# Patient Record
Sex: FEMALE | Race: WHITE | NOT HISPANIC OR LATINO | Employment: UNEMPLOYED | URBAN - METROPOLITAN AREA
[De-identification: names, ages, dates, MRNs, and addresses within clinical notes are randomized per-mention and may not be internally consistent; named-entity substitution may affect disease eponyms.]

---

## 2017-09-03 ENCOUNTER — HOSPITAL ENCOUNTER (EMERGENCY)
Facility: OTHER | Age: 22
Discharge: HOME | End: 2017-09-04
Attending: EMERGENCY MEDICINE
Payer: MEDICAID

## 2017-09-03 ENCOUNTER — NURSE TRIAGE (OUTPATIENT)
Dept: ADMINISTRATIVE | Facility: CLINIC | Age: 22
End: 2017-09-03

## 2017-09-03 DIAGNOSIS — J06.9 ACUTE URI: Primary | ICD-10-CM

## 2017-09-03 LAB
B-HCG UR QL: NEGATIVE
CTP QC/QA: YES

## 2017-09-03 PROCEDURE — 99284 EMERGENCY DEPT VISIT MOD MDM: CPT | Mod: 25

## 2017-09-03 PROCEDURE — 96372 THER/PROPH/DIAG INJ SC/IM: CPT

## 2017-09-03 PROCEDURE — 81025 URINE PREGNANCY TEST: CPT | Performed by: EMERGENCY MEDICINE

## 2017-09-03 NOTE — TELEPHONE ENCOUNTER
"  Reason for Disposition   [1] Caller can't get FB out AND [2] it's causing pain    Answer Assessment - Initial Assessment Questions  1. MECHANISM: "How did it happen?"       It is a feeling  2. LOCATION: "Where is the FB (e.g., splinter) located?"       I feel it in my throat  3. OBJECT: "What type of FB was it?"       Not really sure  4. DEPTH: "How deep do you think the FB goes?"       unsure  5. ONSET: "When did the injury occur?" (Minutes or hours)       6 days  6. PAIN: "Is it painful?" If so, ask: "How bad is the pain?"  (Scale 1-10; or mild, moderate, severe)      Now it is  7. TETANUS: "When was the last tetanus booster?"      unsure      8. PREGNANCY: "Is there any chance you are pregnant?" "When was your last menstrual period?"      No,8/27    Protocols used: ST SKIN FOREIGN BODY-A-AH    "

## 2017-09-04 VITALS
DIASTOLIC BLOOD PRESSURE: 73 MMHG | HEART RATE: 63 BPM | RESPIRATION RATE: 18 BRPM | BODY MASS INDEX: 36.8 KG/M2 | SYSTOLIC BLOOD PRESSURE: 125 MMHG | OXYGEN SATURATION: 99 % | HEIGHT: 62 IN | WEIGHT: 200 LBS | TEMPERATURE: 98 F

## 2017-09-04 PROCEDURE — 63600175 PHARM REV CODE 636 W HCPCS: Performed by: EMERGENCY MEDICINE

## 2017-09-04 RX ORDER — FLUTICASONE PROPIONATE 50 MCG
2 SPRAY, SUSPENSION (ML) NASAL DAILY
Qty: 16 G | Refills: 0 | Status: SHIPPED | OUTPATIENT
Start: 2017-09-04 | End: 2019-06-12

## 2017-09-04 RX ORDER — BENZONATATE 100 MG/1
100 CAPSULE ORAL 3 TIMES DAILY PRN
Qty: 20 CAPSULE | Refills: 0 | Status: SHIPPED | OUTPATIENT
Start: 2017-09-04 | End: 2017-09-14

## 2017-09-04 RX ORDER — LORATADINE 10 MG/1
10 TABLET ORAL DAILY
Qty: 30 TABLET | Refills: 0 | Status: SHIPPED | OUTPATIENT
Start: 2017-09-04 | End: 2018-11-21

## 2017-09-04 RX ORDER — AMOXICILLIN 500 MG/1
500 CAPSULE ORAL 3 TIMES DAILY
Qty: 30 CAPSULE | Refills: 0 | Status: SHIPPED | OUTPATIENT
Start: 2017-09-04 | End: 2017-09-14

## 2017-09-04 RX ORDER — HYDROCODONE POLISTIREX AND CHLORPHENIRAMINE POLISTIREX 10; 8 MG/5ML; MG/5ML
5 SUSPENSION, EXTENDED RELEASE ORAL NIGHTLY PRN
Qty: 60 ML | Refills: 0 | Status: SHIPPED | OUTPATIENT
Start: 2017-09-04 | End: 2019-06-12

## 2017-09-04 RX ORDER — DEXAMETHASONE SODIUM PHOSPHATE 4 MG/ML
8 INJECTION, SOLUTION INTRA-ARTICULAR; INTRALESIONAL; INTRAMUSCULAR; INTRAVENOUS; SOFT TISSUE
Status: COMPLETED | OUTPATIENT
Start: 2017-09-04 | End: 2017-09-04

## 2017-09-04 RX ADMIN — DEXAMETHASONE SODIUM PHOSPHATE 8 MG: 4 INJECTION, SOLUTION INTRA-ARTICULAR; INTRALESIONAL; INTRAMUSCULAR; INTRAVENOUS; SOFT TISSUE at 12:09

## 2017-09-04 NOTE — ED PROVIDER NOTES
Encounter Date: 9/3/2017       History     Chief Complaint   Patient presents with    URI     Pt presents to er with c/o URI symptoms for 7 days.      22 y.o. Female with past medical history of anxiety, allergic rhinitis, rheumatoid arthritis, tobacco abuse and others presents to emergency department complaining of acute nasal congestion, runny nose, sore throat, and dry cough that began about a week ago.  She reports taking Theraflu a few times for the symptoms which did not help.  She describes throat pain as scratchy and constant.  She denies associated shortness of breath, difficulty swallowing, vomiting or food intolerance.          Review of patient's allergies indicates:   Allergen Reactions    No known drug allergies      Past Medical History:   Diagnosis Date    Anemia     Anxiety     AR (allergic rhinitis)     Irregular menses     Obesity     Tobacco abuse      Past Surgical History:   Procedure Laterality Date    APPENDECTOMY       Family History   Problem Relation Age of Onset    Hyperlipidemia Mother     Seizures Mother      Mom had seizures as a child.     Diabetes Father     Heart disease Father     Hypertension Father     Hyperlipidemia Father     Alcohol abuse Maternal Uncle     Drug abuse Maternal Uncle     Arthritis Paternal Aunt     Stroke Paternal Aunt     Early death Paternal Uncle 46     heart attack    Alcohol abuse Maternal Grandmother     Diabetes Maternal Grandmother     Early death Maternal Grandmother 41     heart attack    Seizures Maternal Grandmother     Diabetes Maternal Grandfather     Diabetes Paternal Grandmother      Social History   Substance Use Topics    Smoking status: Former Smoker     Packs/day: 0.25     Years: 0.50     Types: Cigarettes     Quit date: 5/20/2014    Smokeless tobacco: Not on file    Alcohol use No     Review of Systems   Constitutional: Negative for appetite change and fever.   HENT: Positive for congestion, postnasal drip,  rhinorrhea and sore throat. Negative for drooling, sinus pressure, trouble swallowing and voice change.    Eyes: Positive for visual disturbance (history of elevated eye pressures right eye and decreased peripheral vision. Followed for this at [facility]. Work up ongoing.). Negative for photophobia.   Respiratory: Positive for cough. Negative for shortness of breath.    Cardiovascular: Negative for chest pain and palpitations.   Gastrointestinal: Negative for diarrhea, nausea and vomiting.   Genitourinary: Negative for dysuria and hematuria.   Musculoskeletal: Negative for neck stiffness.   Skin: Negative for rash.   Neurological: Positive for headaches.       Physical Exam     Initial Vitals [09/03/17 2059]   BP Pulse Resp Temp SpO2   127/82 83 18 98.3 °F (36.8 °C) 99 %      MAP       97         Physical Exam    Nursing note and vitals reviewed.  Constitutional: Vital signs are normal. She appears well-developed and well-nourished. She is not diaphoretic. No distress.   HENT:   Head: Normocephalic and atraumatic.   Mouth/Throat: Uvula is midline and mucous membranes are normal. No trismus in the jaw. No uvula swelling. Posterior oropharyngeal erythema present. No oropharyngeal exudate or posterior oropharyngeal edema.   Eyes: Conjunctivae are normal. Pupils are equal, round, and reactive to light.   Neck: Normal range of motion and phonation normal. Neck supple. No stridor present. No spinous process tenderness present.   Cardiovascular: Normal rate and intact distal pulses.   Pulmonary/Chest: No accessory muscle usage. No tachypnea. No respiratory distress.   Abdominal: She exhibits no distension. There is no tenderness.   Musculoskeletal: Normal range of motion. She exhibits no tenderness.   Neurological: She is alert and oriented to person, place, and time.   Skin: Skin is warm. Capillary refill takes less than 2 seconds.   Psychiatric: She has a normal mood and affect.         ED Course   Procedures  Labs Reviewed    POCT URINE PREGNANCY                               ED Course      Labs Reviewed  Admission on 09/03/2017, Discharged on 09/04/2017   Component Date Value Ref Range Status    POC Preg Test, Ur 09/03/2017 Negative  Negative Final     Acceptable 09/03/2017 Yes   Final        Medications given in ED    Medications   dexamethasone injection 8 mg (8 mg Intramuscular Given 9/4/17 0059)     Discharge Medications     Discharge Medication List as of 9/4/2017 12:53 AM      START taking these medications    Details   amoxicillin (AMOXIL) 500 MG capsule Take 1 capsule (500 mg total) by mouth 3 (three) times daily., Starting Mon 9/4/2017, Until Thu 9/14/2017, Normal      benzonatate (TESSALON) 100 MG capsule Take 1 capsule (100 mg total) by mouth 3 (three) times daily as needed for Cough., Starting Mon 9/4/2017, Until Thu 9/14/2017, Normal      fluticasone (FLONASE) 50 mcg/actuation nasal spray 2 sprays by Each Nare route once daily., Starting Mon 9/4/2017, Normal      hydrocodone-chlorpheniramine (TUSSIONEX) 10-8 mg/5 mL suspension Take 5 mLs by mouth nightly as needed for Cough or Congestion., Starting Mon 9/4/2017, Print      loratadine (CLARITIN) 10 mg tablet Take 1 tablet (10 mg total) by mouth once daily., Starting Mon 9/4/2017, Until Tue 9/4/2018, Normal         CONTINUE these medications which have NOT CHANGED    Details   diclofenac (VOLTAREN) 75 MG EC tablet TK 1 T PO BID FOR 10 DAYS PRF PAIN, Historical Med      ergocalciferol (ERGOCALCIFEROL) 50,000 unit Cap Take 1 capsule (50,000 Units total) by mouth every 7 days., Starting 3/23/2017, Until Discontinued, Normal      etonogestrel-ethinyl estradiol (NUVARING) 0.12-0.015 mg/24 hr vaginal ring Place 1 each vaginally every 21 days. Insert one (1) ring vaginally and leave in place for three (3) weeks, then remove for one (1) week., Starting 3/20/2017, Until Tue 3/20/18, Normal      ferrous gluconate (FERGON) 240 (27 FE) MG tablet Take 2 tablets (480 mg  total) by mouth once daily., Starting 11/21/2014, Until Discontinued, Normal      hydrocodone-acetaminophen 5-325mg (NORCO) 5-325 mg per tablet TK 1 T PO Q 4 TO 6 H PRN P, Historical Med      !! ibuprofen (ADVIL,MOTRIN) 200 MG tablet Take 200 mg by mouth., Until Discontinued, Historical Med      !! ibuprofen (ADVIL,MOTRIN) 800 MG tablet TK 1 T PO Q 6-8 H PRN P, Historical Med      norgestimate-ethinyl estradiol (ORTHO-CYCLEN) 0.25-35 mg-mcg per tablet Take 1 tablet by mouth once daily., Starting 7/27/2015, Until Tue 7/26/16, Normal      PNV w/o calcium-iron fum-FA (M-VIT) 27-1 mg Tab Take 1 tablet by mouth once daily., Starting 3/23/2017, Until Fri 3/23/18, Normal      PNV38-iron cbn&gluc-FA-DSS-dha (CITRANATAL ASSURE) 35-1- mg Cmpk Take one daily, Normal       !! - Potential duplicate medications found. Please discuss with provider.                Patient discharged to home in stable condition with instructions to:   1. Please take all meds as prescribed.  2. Follow-up with your primary care doctor   3. Return precautions discussed and patient and/or family/caretaker understands to return to the emergency room for any concerns including worsening of your current symptoms, fever, chills, night sweats, worsening pain, chest pain, shortness of breath, nausea, vomiting, diarrhea, bleeding, headache, difficulty talking, visual disturbances, weakness, numbness or any other acute concerns      Note was created using voice recognition software. Note may have occasional typographical errors that may not have been identified and edited despite good susie initial review prior to signing.    Clinical Impression:   The encounter diagnosis was Acute URI.                           Alyssa Cam MD  09/30/17 [id number]

## 2017-09-04 NOTE — ED NOTES
Pt identifiers checked and correct.    LOC: The patient is awake, alert and aware of environment with an appropriate affect, the patient is oriented x 3 and speaking appropriately.   APPEARANCE: Patient resting comfortably and in no acute distress, patient is clean and well groomed, patient's clothing is properly fastened.   SKIN: The skin is warm and dry, color consistent with ethnicity, patient has normal skin turgor and moist mucus membranes, skin intact, no breakdown or bruising noted. Denies any fever at this time.   MUSCULOSKELETAL: Patient moving all extremities spontaneously, no obvious swelling or deformities noted.   RESPIRATORY: Airway is open and patent, respirations are spontaneous, patient has a normal effort and rate, no accessory muscle use noted. + cough, congestion.  CARDIAC: Patient has a normal rate and regular rhythm, no periphreal edema noted, capillary refill < 3 seconds.   ABDOMEN: Soft and non tender to palpation, no distention noted, active bowel sounds present in all four quadrants.   NEUROLOGIC: PERRL, 3 mm bilaterally, eyes open spontaneously, behavior appropriate to situation, follows commands, facial expression symmetrical, bilateral hand grasp equal and even, purposeful motor response noted, normal sensation in all extremities when touched with a finger.

## 2018-05-01 ENCOUNTER — HOSPITAL ENCOUNTER (EMERGENCY)
Facility: HOSPITAL | Age: 23
Discharge: HOME | End: 2018-05-01
Attending: INTERNAL MEDICINE
Payer: MEDICAID

## 2018-05-01 VITALS
DIASTOLIC BLOOD PRESSURE: 75 MMHG | HEIGHT: 62 IN | HEART RATE: 94 BPM | RESPIRATION RATE: 18 BRPM | TEMPERATURE: 99 F | BODY MASS INDEX: 34.96 KG/M2 | WEIGHT: 190 LBS | SYSTOLIC BLOOD PRESSURE: 128 MMHG | OXYGEN SATURATION: 98 %

## 2018-05-01 DIAGNOSIS — A08.4 VIRAL GASTROENTERITIS: Primary | ICD-10-CM

## 2018-05-01 PROBLEM — K29.70 VIRAL GASTRITIS: Status: ACTIVE | Noted: 2018-05-01

## 2018-05-01 LAB
B-HCG UR QL: NEGATIVE
BILIRUBIN, POC UA: ABNORMAL
BLOOD, POC UA: ABNORMAL
CLARITY, POC UA: CLEAR
COLOR, POC UA: YELLOW
CTP QC/QA: YES
GLUCOSE, POC UA: NEGATIVE
KETONES, POC UA: NEGATIVE
LEUKOCYTE EST, POC UA: ABNORMAL
NITRITE, POC UA: NEGATIVE
PH UR STRIP: 6 [PH]
PROTEIN, POC UA: ABNORMAL
SPECIFIC GRAVITY, POC UA: >=1.03
UROBILINOGEN, POC UA: 1 E.U./DL

## 2018-05-01 PROCEDURE — 81003 URINALYSIS AUTO W/O SCOPE: CPT

## 2018-05-01 PROCEDURE — 99283 EMERGENCY DEPT VISIT LOW MDM: CPT

## 2018-05-01 PROCEDURE — 81025 URINE PREGNANCY TEST: CPT | Performed by: INTERNAL MEDICINE

## 2018-05-01 RX ORDER — ONDANSETRON 4 MG/1
4 TABLET, FILM COATED ORAL EVERY 6 HOURS PRN
Qty: 12 TABLET | Refills: 0 | Status: SHIPPED | OUTPATIENT
Start: 2018-05-01 | End: 2019-06-12

## 2018-05-01 NOTE — ED PROVIDER NOTES
Encounter Date: 5/1/2018       History     Chief Complaint   Patient presents with    Abdominal Pain     pt reports epigatric pain that started yesterday with diarrhea, also reports vomitting once today    Vomiting    Diarrhea     23-year-old female presents to the emergency department complaining of abdominal pain ×3 days, loose stools ×3 days and one episode of emesis today with nausea.  Denies fevers/chills      The history is provided by the patient. No  was used.   Abdominal Pain   The current episode started several days ago. The onset of the illness was gradual. The problem has not changed since onset.The abdominal pain is located in the epigastric region. The abdominal pain does not radiate. The severity of the abdominal pain is 3/10. The abdominal pain is relieved by nothing. The other symptoms of the illness include nausea, vomiting and diarrhea. The other symptoms of the illness do not include fever or melena.   The diarrhea began 3 to 5 days ago. The diarrhea is semi-solid. The diarrhea occurs 2 - 4 times per day.   Nausea began today. The nausea is associated with eating.   The vomiting began today. Vomiting occurred once. The emesis contains stomach contents.   The patient states that she believes she is currently not pregnant. The patient has had a change in bowel habit. Symptoms associated with the illness do not include chills.     Review of patient's allergies indicates:   Allergen Reactions    No known drug allergies      Past Medical History:   Diagnosis Date    Anemia     Anxiety     AR (allergic rhinitis)     Irregular menses     Obesity     Tobacco abuse      Past Surgical History:   Procedure Laterality Date    APPENDECTOMY       Family History   Problem Relation Age of Onset    Hyperlipidemia Mother     Seizures Mother      Mom had seizures as a child.     Diabetes Father     Heart disease Father     Hypertension Father     Hyperlipidemia Father      Alcohol abuse Maternal Uncle     Drug abuse Maternal Uncle     Arthritis Paternal Aunt     Stroke Paternal Aunt     Early death Paternal Uncle 46     heart attack    Alcohol abuse Maternal Grandmother     Diabetes Maternal Grandmother     Early death Maternal Grandmother 41     heart attack    Seizures Maternal Grandmother     Diabetes Maternal Grandfather     Diabetes Paternal Grandmother      Social History   Substance Use Topics    Smoking status: Former Smoker     Packs/day: 0.25     Years: 0.50     Types: Cigarettes     Quit date: 5/20/2014    Smokeless tobacco: Not on file    Alcohol use No     Review of Systems   Constitutional: Negative for chills and fever.   Gastrointestinal: Positive for abdominal pain, diarrhea, nausea and vomiting. Negative for melena.   All other systems reviewed and are negative.      Physical Exam     Initial Vitals [05/01/18 1243]   BP Pulse Resp Temp SpO2   128/75 94 18 98.7 °F (37.1 °C) 98 %      MAP       92.67         Physical Exam    Nursing note and vitals reviewed.  Constitutional: She appears well-developed and well-nourished. No distress.   HENT:   Head: Normocephalic and atraumatic.   Right Ear: External ear normal.   Left Ear: External ear normal.   Eyes: EOM are normal.   Neck: Normal range of motion.   Cardiovascular: Normal rate and regular rhythm.   Pulmonary/Chest: Breath sounds normal. No respiratory distress.   Abdominal: Soft. Bowel sounds are normal. She exhibits no distension.   Musculoskeletal: Normal range of motion.   Neurological: She is alert.   Skin: Skin is warm and dry.   Psychiatric: She has a normal mood and affect.         ED Course   Procedures  Labs Reviewed   POCT URINALYSIS W/O SCOPE - Abnormal; Notable for the following:        Result Value    Glucose, UA Negative (*)     Bilirubin, UA 1+ (*)     Ketones, UA Negative (*)     Spec Grav UA >=1.030 (*)     Blood, UA 3+ (*)     Protein, UA 1+ (*)     Nitrite, UA Negative (*)      Leukocytes, UA Trace (*)     All other components within normal limits   POCT URINE PREGNANCY   POCT URINALYSIS W/O SCOPE             Medical Decision Making:   Initial Assessment:   23-year-old female presents to the emergency department complaining of abdominal pain ×3 days, loose stools ×3 days and one episode of emesis today with nausea.  Denies fevers/chills  ED Management:  Patient was given instructions for acute gastroenteritis and a prescription for Zofran.  She was advised to follow-up with her primary care physician within the next 3 days for reevaluation.                      Clinical Impression:   The encounter diagnosis was Viral gastroenteritis.    Disposition:   Disposition: Discharged  Condition: Stable                        Woody Sims MD  05/01/18 7936

## 2018-11-21 PROBLEM — A08.4 VIRAL GASTROENTERITIS: Status: RESOLVED | Noted: 2018-05-01 | Resolved: 2018-11-21

## 2018-11-21 PROBLEM — M06.9 RHEUMATOID ARTHRITIS INVOLVING BOTH FEET: Status: ACTIVE | Noted: 2018-11-21

## 2019-03-27 PROBLEM — O35.EXX0 PYELECTASIS OF FETUS ON PRENATAL ULTRASOUND: Status: ACTIVE | Noted: 2019-03-27

## 2019-03-27 PROBLEM — D64.9 ANEMIA: Status: ACTIVE | Noted: 2019-03-27

## 2019-07-18 PROBLEM — O35.EXX0 PYELECTASIS OF FETUS ON PRENATAL ULTRASOUND: Status: RESOLVED | Noted: 2019-03-27 | Resolved: 2019-07-18

## 2019-07-18 PROBLEM — M06.9 RHEUMATOID ARTHRITIS INVOLVING BOTH FEET: Status: RESOLVED | Noted: 2018-11-21 | Resolved: 2019-07-18

## 2021-04-15 ENCOUNTER — PATIENT MESSAGE (OUTPATIENT)
Dept: RESEARCH | Facility: HOSPITAL | Age: 26
End: 2021-04-15

## 2021-12-06 ENCOUNTER — HOSPITAL ENCOUNTER (EMERGENCY)
Facility: HOSPITAL | Age: 26
Discharge: HOME | End: 2021-12-06
Attending: EMERGENCY MEDICINE
Payer: MEDICAID

## 2021-12-06 VITALS
HEART RATE: 98 BPM | SYSTOLIC BLOOD PRESSURE: 128 MMHG | RESPIRATION RATE: 20 BRPM | DIASTOLIC BLOOD PRESSURE: 68 MMHG | HEIGHT: 62 IN | TEMPERATURE: 99 F | OXYGEN SATURATION: 100 % | BODY MASS INDEX: 36.62 KG/M2 | WEIGHT: 199 LBS

## 2021-12-06 DIAGNOSIS — H66.93 BILATERAL OTITIS MEDIA, UNSPECIFIED OTITIS MEDIA TYPE: Primary | ICD-10-CM

## 2021-12-06 PROCEDURE — 99283 EMERGENCY DEPT VISIT LOW MDM: CPT | Mod: ER

## 2021-12-06 RX ORDER — AMOXICILLIN AND CLAVULANATE POTASSIUM 875; 125 MG/1; MG/1
1 TABLET, FILM COATED ORAL 2 TIMES DAILY
Qty: 14 TABLET | Refills: 0 | Status: SHIPPED | OUTPATIENT
Start: 2021-12-06

## 2021-12-21 ENCOUNTER — OFFICE VISIT (OUTPATIENT)
Dept: URGENT CARE | Facility: CLINIC | Age: 26
End: 2021-12-21
Payer: MEDICAID

## 2021-12-21 VITALS
WEIGHT: 192 LBS | HEIGHT: 62 IN | RESPIRATION RATE: 20 BRPM | DIASTOLIC BLOOD PRESSURE: 81 MMHG | SYSTOLIC BLOOD PRESSURE: 140 MMHG | BODY MASS INDEX: 35.33 KG/M2 | TEMPERATURE: 99 F | OXYGEN SATURATION: 95 % | HEART RATE: 105 BPM

## 2021-12-21 DIAGNOSIS — U07.1 COVID-19: ICD-10-CM

## 2021-12-21 DIAGNOSIS — Z20.822 ENCOUNTER FOR LABORATORY TESTING FOR COVID-19 VIRUS: Primary | ICD-10-CM

## 2021-12-21 LAB
CTP QC/QA: YES
SARS-COV-2 RDRP RESP QL NAA+PROBE: POSITIVE

## 2021-12-21 PROCEDURE — 99203 OFFICE O/P NEW LOW 30 MIN: CPT | Mod: S$GLB,,, | Performed by: PHYSICIAN ASSISTANT

## 2021-12-21 PROCEDURE — U0002: ICD-10-PCS | Mod: QW,S$GLB,, | Performed by: PHYSICIAN ASSISTANT

## 2021-12-21 PROCEDURE — 99203 PR OFFICE/OUTPT VISIT, NEW, LEVL III, 30-44 MIN: ICD-10-PCS | Mod: S$GLB,,, | Performed by: PHYSICIAN ASSISTANT

## 2021-12-21 PROCEDURE — U0002 COVID-19 LAB TEST NON-CDC: HCPCS | Mod: QW,S$GLB,, | Performed by: PHYSICIAN ASSISTANT

## 2021-12-22 ENCOUNTER — INFUSION (OUTPATIENT)
Dept: INFECTIOUS DISEASES | Facility: HOSPITAL | Age: 26
End: 2021-12-22
Attending: INTERNAL MEDICINE
Payer: MEDICAID

## 2021-12-22 VITALS
HEART RATE: 88 BPM | HEIGHT: 62 IN | RESPIRATION RATE: 18 BRPM | SYSTOLIC BLOOD PRESSURE: 108 MMHG | OXYGEN SATURATION: 100 % | TEMPERATURE: 98 F | BODY MASS INDEX: 37.17 KG/M2 | WEIGHT: 202 LBS | DIASTOLIC BLOOD PRESSURE: 58 MMHG

## 2021-12-22 DIAGNOSIS — U07.1 COVID-19: Primary | ICD-10-CM

## 2021-12-22 PROCEDURE — 63600175 PHARM REV CODE 636 W HCPCS: Performed by: INTERNAL MEDICINE

## 2021-12-22 PROCEDURE — 25000003 PHARM REV CODE 250: Performed by: INTERNAL MEDICINE

## 2021-12-22 PROCEDURE — M0243 CASIRIVI AND IMDEVI INFUSION: HCPCS | Performed by: INTERNAL MEDICINE

## 2021-12-22 RX ORDER — SODIUM CHLORIDE 0.9 % (FLUSH) 0.9 %
10 SYRINGE (ML) INJECTION
Status: ACTIVE | OUTPATIENT
Start: 2021-12-22

## 2021-12-22 RX ORDER — ACETAMINOPHEN 325 MG/1
650 TABLET ORAL ONCE AS NEEDED
Status: DISCONTINUED | OUTPATIENT
Start: 2021-12-22 | End: 2023-04-17

## 2021-12-22 RX ORDER — ALBUTEROL SULFATE 90 UG/1
2 AEROSOL, METERED RESPIRATORY (INHALATION)
Status: ACTIVE | OUTPATIENT
Start: 2021-12-22

## 2021-12-22 RX ORDER — DIPHENHYDRAMINE HYDROCHLORIDE 50 MG/ML
25 INJECTION INTRAMUSCULAR; INTRAVENOUS ONCE AS NEEDED
Status: ACTIVE | OUTPATIENT
Start: 2021-12-22 | End: 2033-05-20

## 2021-12-22 RX ORDER — EPINEPHRINE 0.3 MG/.3ML
0.3 INJECTION SUBCUTANEOUS
Status: ACTIVE | OUTPATIENT
Start: 2021-12-22

## 2021-12-22 RX ORDER — ONDANSETRON 4 MG/1
4 TABLET, ORALLY DISINTEGRATING ORAL ONCE AS NEEDED
Status: ACTIVE | OUTPATIENT
Start: 2021-12-22 | End: 2033-05-20

## 2021-12-22 RX ADMIN — CASIRIVIMAB AND IMDEVIMAB 600 MG: 600; 600 INJECTION, SOLUTION, CONCENTRATE INTRAVENOUS at 02:12

## 2023-03-22 ENCOUNTER — HOSPITAL ENCOUNTER (EMERGENCY)
Facility: HOSPITAL | Age: 28
Discharge: HOME | End: 2023-03-22
Attending: EMERGENCY MEDICINE
Payer: MEDICAID

## 2023-03-22 VITALS
SYSTOLIC BLOOD PRESSURE: 125 MMHG | WEIGHT: 125 LBS | HEART RATE: 102 BPM | OXYGEN SATURATION: 97 % | TEMPERATURE: 98 F | RESPIRATION RATE: 17 BRPM | DIASTOLIC BLOOD PRESSURE: 85 MMHG | HEIGHT: 63 IN | BODY MASS INDEX: 22.15 KG/M2

## 2023-03-22 DIAGNOSIS — K81.9 CHOLECYSTITIS: ICD-10-CM

## 2023-03-22 DIAGNOSIS — R10.11 RIGHT UPPER QUADRANT PAIN: Primary | ICD-10-CM

## 2023-03-22 LAB
ALBUMIN SERPL-MCNC: 3.6 G/DL (ref 3.3–5.5)
ALBUMIN SERPL-MCNC: 3.8 G/DL (ref 3.3–5.5)
ALP SERPL-CCNC: 112 U/L (ref 42–141)
ALP SERPL-CCNC: 119 U/L (ref 42–141)
B-HCG UR QL: NEGATIVE
BILIRUB SERPL-MCNC: 0.5 MG/DL (ref 0.2–1.6)
BILIRUB SERPL-MCNC: 0.5 MG/DL (ref 0.2–1.6)
BILIRUBIN, POC UA: NEGATIVE
BLOOD, POC UA: NEGATIVE
BUN SERPL-MCNC: 12 MG/DL (ref 7–22)
CALCIUM SERPL-MCNC: 9.4 MG/DL (ref 8–10.3)
CHLORIDE SERPL-SCNC: 108 MMOL/L (ref 98–108)
CLARITY, POC UA: CLEAR
COLOR, POC UA: YELLOW
CREAT SERPL-MCNC: 0.5 MG/DL (ref 0.6–1.2)
CTP QC/QA: YES
GLUCOSE SERPL-MCNC: 91 MG/DL (ref 73–118)
GLUCOSE, POC UA: NEGATIVE
KETONES, POC UA: NEGATIVE
LEUKOCYTE EST, POC UA: NEGATIVE
NITRITE, POC UA: NEGATIVE
PH UR STRIP: 7.5 [PH]
POC ALT (SGPT): 18 U/L (ref 10–47)
POC ALT (SGPT): 31 U/L (ref 10–47)
POC AMYLASE: 25 U/L (ref 14–97)
POC AST (SGOT): 27 U/L (ref 11–38)
POC AST (SGOT): 27 U/L (ref 11–38)
POC GGT: 43 U/L (ref 5–65)
POC TCO2: 25 MMOL/L (ref 18–33)
POTASSIUM BLD-SCNC: 4.1 MMOL/L (ref 3.6–5.1)
PROTEIN, POC UA: NEGATIVE
PROTEIN, POC: 6.2 G/DL (ref 6.4–8.1)
PROTEIN, POC: 6.6 G/DL (ref 6.4–8.1)
SODIUM BLD-SCNC: 141 MMOL/L (ref 128–145)
SPECIFIC GRAVITY, POC UA: 1.02
UROBILINOGEN, POC UA: 0.2 E.U./DL

## 2023-03-22 PROCEDURE — 80053 COMPREHEN METABOLIC PANEL: CPT | Mod: ER

## 2023-03-22 PROCEDURE — 82150 ASSAY OF AMYLASE: CPT | Mod: ER

## 2023-03-22 PROCEDURE — 96374 THER/PROPH/DIAG INJ IV PUSH: CPT | Mod: ER

## 2023-03-22 PROCEDURE — 99285 EMERGENCY DEPT VISIT HI MDM: CPT | Mod: 25,ER

## 2023-03-22 PROCEDURE — 96375 TX/PRO/DX INJ NEW DRUG ADDON: CPT | Mod: ER

## 2023-03-22 PROCEDURE — 81025 URINE PREGNANCY TEST: CPT | Mod: ER | Performed by: EMERGENCY MEDICINE

## 2023-03-22 PROCEDURE — 81003 URINALYSIS AUTO W/O SCOPE: CPT | Mod: ER

## 2023-03-22 PROCEDURE — 63600175 PHARM REV CODE 636 W HCPCS: Mod: ER | Performed by: EMERGENCY MEDICINE

## 2023-03-22 PROCEDURE — 85025 COMPLETE CBC W/AUTO DIFF WBC: CPT | Mod: ER

## 2023-03-22 RX ORDER — ONDANSETRON 2 MG/ML
4 INJECTION INTRAMUSCULAR; INTRAVENOUS
Status: COMPLETED | OUTPATIENT
Start: 2023-03-22 | End: 2023-03-22

## 2023-03-22 RX ORDER — METHIMAZOLE 5 MG/1
5 TABLET ORAL 3 TIMES DAILY
COMMUNITY
Start: 2022-10-10

## 2023-03-22 RX ORDER — DIAZEPAM 10 MG/1
TABLET ORAL
COMMUNITY
Start: 2022-11-01

## 2023-03-22 RX ORDER — PROPYLTHIOURACIL 50 MG/1
50 TABLET ORAL 3 TIMES DAILY
COMMUNITY
Start: 2023-03-16

## 2023-03-22 RX ORDER — HYDROCODONE BITARTRATE AND ACETAMINOPHEN 7.5; 325 MG/1; MG/1
TABLET ORAL
COMMUNITY
Start: 2022-11-01

## 2023-03-22 RX ORDER — METFORMIN HYDROCHLORIDE 500 MG/1
500 TABLET, EXTENDED RELEASE ORAL
COMMUNITY
Start: 2023-03-16

## 2023-03-22 RX ORDER — HYDROMORPHONE HYDROCHLORIDE 2 MG/ML
1 INJECTION, SOLUTION INTRAMUSCULAR; INTRAVENOUS; SUBCUTANEOUS
Status: COMPLETED | OUTPATIENT
Start: 2023-03-22 | End: 2023-03-22

## 2023-03-22 RX ORDER — METOCLOPRAMIDE 10 MG/1
10 TABLET ORAL EVERY 6 HOURS PRN
Qty: 30 TABLET | Refills: 0 | Status: SHIPPED | OUTPATIENT
Start: 2023-03-22

## 2023-03-22 RX ORDER — METHIMAZOLE 5 MG/1
5 TABLET ORAL
COMMUNITY

## 2023-03-22 RX ORDER — METHOTREXATE 2.5 MG/1
15 TABLET ORAL
COMMUNITY
Start: 2022-12-08

## 2023-03-22 RX ORDER — PROPRANOLOL HYDROCHLORIDE 10 MG/1
10 TABLET ORAL 2 TIMES DAILY
COMMUNITY
Start: 2022-10-10

## 2023-03-22 RX ORDER — FOLIC ACID 1 MG/1
1000 TABLET ORAL
COMMUNITY
Start: 2022-12-19

## 2023-03-22 RX ORDER — FOLIC ACID 1 MG/1
1 TABLET ORAL
COMMUNITY
Start: 2022-09-06 | End: 2023-09-01

## 2023-03-22 RX ORDER — TRAMADOL HYDROCHLORIDE 50 MG/1
50 TABLET ORAL EVERY 6 HOURS PRN
Qty: 12 TABLET | Refills: 0 | Status: SHIPPED | OUTPATIENT
Start: 2023-03-22 | End: 2023-04-01

## 2023-03-22 RX ORDER — PROPYLTHIOURACIL 50 MG/1
50 TABLET ORAL
COMMUNITY

## 2023-03-22 RX ORDER — ONDANSETRON 4 MG/1
4 TABLET, ORALLY DISINTEGRATING ORAL EVERY 8 HOURS PRN
COMMUNITY
Start: 2022-11-01

## 2023-03-22 RX ADMIN — ONDANSETRON 4 MG: 2 INJECTION INTRAMUSCULAR; INTRAVENOUS at 12:03

## 2023-03-22 RX ADMIN — HYDROMORPHONE HYDROCHLORIDE 1 MG: 2 INJECTION, SOLUTION INTRAMUSCULAR; INTRAVENOUS; SUBCUTANEOUS at 12:03

## 2023-03-22 NOTE — ED PROVIDER NOTES
Encounter Date: 3/22/2023    SCRIBE #1 NOTE: I, Jessica Olivera, am scribing for, and in the presence of,  Cedrick Mccarthy MD. I have scribed the following portions of the note - Other sections scribed: HPI, ROS, PE.     History     Chief Complaint   Patient presents with    Abdominal Pain     PT REPORTS RUQ PAIN RADIATING TO RIGHT FLANK FOR 1 HOUR, C/O NAUSEA     This is a 18 year old female that present to the ED with a complaint of acute RUQ abdominal pain that radiates to the right flank with associated nausea that began approximately 1 hour ago. Patient notes the pain started suddenly while she was working. Rated as a constant 7/10. She reports the area is tender with light palpation. Pain is slightly relieved when pressing into the area. Per historian, the patient's family has a history of kidney and gall stones. No other exacerbating or alleviating factors. No medication for treatment of symptoms. Denies vomiting, diarrhea, constipation, urinary frequency, urgency, or other symptoms. Patient also notes light stinging with urination. No further complaints at present time.     The history is provided by the patient.   Review of patient's allergies indicates:   Allergen Reactions    No known drug allergies      Past Medical History:   Diagnosis Date    Anemia     Anxiety     AR (allergic rhinitis)     Graves disease     Irregular menses     Obesity     Tobacco abuse      Past Surgical History:   Procedure Laterality Date    APPENDECTOMY      TONSILLECTOMY       Family History   Problem Relation Age of Onset    Hyperlipidemia Mother     Seizures Mother         Mom had seizures as a child.     Diabetes Father     Heart disease Father     Hypertension Father     Hyperlipidemia Father     Alcohol abuse Maternal Uncle     Drug abuse Maternal Uncle     Arthritis Paternal Aunt     Stroke Paternal Aunt     Early death Paternal Uncle 46        heart attack    Alcohol abuse Maternal Grandmother     Diabetes Maternal  Grandmother     Early death Maternal Grandmother 41        heart attack    Seizures Maternal Grandmother     Diabetes Maternal Grandfather     Diabetes Paternal Grandmother      Social History     Tobacco Use    Smoking status: Every Day     Packs/day: 1.00     Types: Cigarettes     Last attempt to quit: 2014     Years since quittin.8    Smokeless tobacco: Never   Substance Use Topics    Alcohol use: Yes     Comment: RARELY    Drug use: No     Review of Systems   Constitutional: Negative.  Negative for fever.   HENT: Negative.  Negative for sore throat.    Eyes: Negative.    Respiratory: Negative.  Negative for shortness of breath.    Cardiovascular: Negative.  Negative for chest pain.   Gastrointestinal:  Positive for abdominal pain and nausea. Negative for constipation, diarrhea and vomiting.   Endocrine: Negative.    Genitourinary:  Positive for dysuria and flank pain. Negative for frequency and urgency.   Musculoskeletal:  Negative for myalgias.   Skin: Negative.  Negative for rash.   Allergic/Immunologic: Negative.    Neurological: Negative.  Negative for headaches.   Hematological: Negative.  Negative for adenopathy.   Psychiatric/Behavioral: Negative.  Negative for behavioral problems.    All other systems reviewed and are negative.    Physical Exam     Initial Vitals [23 0008]   BP Pulse Resp Temp SpO2   125/85 102 20 98.2 °F (36.8 °C) 97 %      MAP       --         Physical Exam    Nursing note and vitals reviewed.  Constitutional: She appears well-developed and well-nourished.   HENT:   Head: Normocephalic and atraumatic.   Eyes: Conjunctivae and EOM are normal. Pupils are equal, round, and reactive to light.   Neck: Neck supple. No thyroid mass present.   Cardiovascular:  Normal rate, regular rhythm, S1 normal, S2 normal, normal heart sounds and intact distal pulses.           Pulmonary/Chest: Effort normal and breath sounds normal. No respiratory distress.   Abdominal: Abdomen is soft.  Bowel sounds are normal.   Mild voluntary guarding and rebound.   Musculoskeletal:         General: No tenderness. Normal range of motion.      Cervical back: Neck supple.     Lymphadenopathy:     She has no axillary adenopathy.   Neurological: She is alert and oriented to person, place, and time. GCS eye subscore is 4. GCS verbal subscore is 5. GCS motor subscore is 6.   Skin: Skin is warm, dry and intact.   Psychiatric: She has a normal mood and affect. Her speech is normal. Judgment normal. Cognition and memory are normal.       ED Course   Procedures  Labs Reviewed   POCT CMP - Abnormal; Notable for the following components:       Result Value    POC Creatinine 0.5 (*)     Protein, POC 6.2 (*)     All other components within normal limits   POCT URINE PREGNANCY   POCT URINALYSIS W/O SCOPE   POCT URINALYSIS W/O SCOPE   POCT CBC   POCT CMP   POCT LIVER PANEL   POCT LIVER PANEL        Results for orders placed or performed during the hospital encounter of 03/22/23   POCT urine pregnancy   Result Value Ref Range    POC Preg Test, Ur Negative Negative     Acceptable Yes    POCT URINALYSIS W/O SCOPE   Result Value Ref Range    Glucose, UA Negative     Bilirubin, UA Negative     Ketones, UA Negative     Spec Grav UA 1.020     Blood, UA Negative     PH, UA 7.5     Protein, UA Negative     Urobilinogen, UA 0.2 E.U./dL    Nitrite, UA Negative     Leukocytes, UA Negative     Color, UA Yellow     Clarity, UA Clear    POCT CMP   Result Value Ref Range    Albumin, POC 3.6 3.3 - 5.5 g/dL    Alkaline Phosphatase,  42 - 141 U/L    ALT (SGPT), POC 31 10 - 47 U/L    AST (SGOT), POC 27 11 - 38 U/L    POC BUN 12 7 - 22 mg/dL    Calcium, POC 9.4 8.0 - 10.3 mg/dL    POC Chloride 108 98 - 108 mmol/L    POC Creatinine 0.5 (L) 0.6 - 1.2 mg/dL    POC Glucose 91 73 - 118 mg/dL    POC Potassium 4.1 3.6 - 5.1 mmol/L    POC Sodium 141 128 - 145 mmol/L    Bilirubin, POC 0.5 0.2 - 1.6 mg/dL    POC TCO2 25 18 - 33 mmol/L     Protein, POC 6.2 (L) 6.4 - 8.1 g/dL   POCT Liver Panel   Result Value Ref Range    Albumin, POC 3.8 3.3 - 5.5 g/dL    Alkaline Phosphatase,  42 - 141 U/L    ALT (SGPT), POC 18 10 - 47 U/L    Amylase, POC 25 14 - 97 U/L    AST (SGOT), POC 27 11 - 38 U/L    POC GGT 43 5 - 65 U/L    Bilirubin, POC 0.5 0.2 - 1.6 mg/dL    Protein, POC 6.6 6.4 - 8.1 g/dL         Imaging Results              US Abdomen Limited (Gallbladder) (Final result)  Result time 03/22/23 01:16:33      Final result by Marques Lozano MD (03/22/23 01:16:33)                   Impression:      No significant abnormalities identified.      Electronically signed by: Marques Lozano MD  Date:    03/22/2023  Time:    01:16               Narrative:    EXAMINATION:  US ABDOMEN LIMITED    CLINICAL HISTORY:  Cholecystitis, unspecified    TECHNIQUE:  Limited ultrasound of the right upper quadrant of the abdomen was performed.    COMPARISON:  None.    FINDINGS:  Visualized hepatic parenchyma is homogeneous without evidence for masses.  No intra- or extrahepatic biliary ductal dilatation. The common bile duct measures 0.3 cm.  The gallbladder is partially contracted.  No gallstones are visualized.  Sonographic Friend's sign is negative. The visualized portion of the pancreas appears normal.  No evidence of right-sided hydronephrosis.  No ascites.                                       Imaging Results              US Abdomen Limited (Gallbladder) (Final result)  Result time 03/22/23 01:16:33      Final result by Marques Lozano MD (03/22/23 01:16:33)                   Impression:      No significant abnormalities identified.      Electronically signed by: Marques Lozano MD  Date:    03/22/2023  Time:    01:16               Narrative:    EXAMINATION:  US ABDOMEN LIMITED    CLINICAL HISTORY:  Cholecystitis, unspecified    TECHNIQUE:  Limited ultrasound of the right upper quadrant of the abdomen was performed.    COMPARISON:  None.    FINDINGS:  Visualized  hepatic parenchyma is homogeneous without evidence for masses.  No intra- or extrahepatic biliary ductal dilatation. The common bile duct measures 0.3 cm.  The gallbladder is partially contracted.  No gallstones are visualized.  Sonographic Friend's sign is negative. The visualized portion of the pancreas appears normal.  No evidence of right-sided hydronephrosis.  No ascites.                                      Medications   HYDROmorphone (PF) injection 1 mg (1 mg Intravenous Given 3/22/23 0037)   ondansetron injection 4 mg (4 mg Intravenous Given 3/22/23 0037)     Medical Decision Making:   History:   I obtained history from: someone other than patient.       <> Summary of History: Mother contributed to the medical record.  Old Medical Records: I decided to obtain old medical records.  Clinical Tests:   Lab Tests: Ordered and Reviewed  Radiological Study: Ordered and Reviewed  ED Management:  This patient presents with right upper quadrant abdominal pain.  Strongly expecting to find gallstones as patient has very strong family history and had classic findings consistent with acute cholecystitis however patient had no white count no fever no elevation of LFTs in ultrasonography did not reveal any gallstones.  Do believe the still potential for dysfunctional gallbladder is most likely etiology I considered gastrointestinal neurovascular etiologies as well.  Patient had significant subsequent improvement in her pain and will follow-up with gastroenterology as an outpatient.      This document was produced by a scribe under my direction and in my presence. I agree with the content of the note and have made any necessary edits.     Cedrick Mccarthy MD    03/22/2023 6:39 AM      Scribe Attestation:   Scribe #1: I performed the above scribed service and the documentation accurately describes the services I performed. I attest to the accuracy of the note.                   Clinical Impression:   Final  diagnoses:  [K81.9] Cholecystitis  [R10.11] Right upper quadrant pain (Primary)        ED Disposition Condition    Discharge Stable          ED Prescriptions       Medication Sig Dispense Start Date End Date Auth. Provider    traMADoL (ULTRAM) 50 mg tablet Take 1 tablet (50 mg total) by mouth every 6 (six) hours as needed for Pain. 12 tablet 3/22/2023 4/1/2023 Cedrick Mccarthy MD    metoclopramide HCl (REGLAN) 10 MG tablet Take 1 tablet (10 mg total) by mouth every 6 (six) hours as needed. 30 tablet 3/22/2023 -- Cedrick Mccarthy MD          Follow-up Information       Follow up With Specialties Details Why Contact Info    Robert Kaplan MD Family Medicine Schedule an appointment as soon as possible for a visit in 1 week  [id number] [facility] [address] [phone]               Cedrick Mccarthy MD  03/22/23 0602

## 2023-03-22 NOTE — ED NOTES
PT GIVEN DISCHARGE INSTRUCTIONS INCLUDING RX, PAIN CONTROL, AND FOLLOW UP. VERBALIZES UNDERSTANDING. VSS, NO S/S OF ACUTE DISTRESS NOTED UPON DEPARTURE.

## 2023-03-22 NOTE — ED TRIAGE NOTES
PT C/O R ABD PAIN THAT STARTS AT UMBILICUS AND RADIATES TO R FLANK. STARTED SUDDENLY WHILE SHE WAS WORKING APPROX 1HR PTA. 7/10 CONTINUOUS PAIN ACCOMPANIED BY NAUSEA W/O VOMITING. DENIES CONSTIPATION, DIARRHEA, URINARY FREQUENCY OR URGENCY BUT DOES ENDORSE STINGING W/ URINATION. STATES THE AREA IS TENDER TO LIGHT PALPATION BUT FEELS BETTER WHEN PRESSING INTO THE AREA AND WHEN SHE IS HUNCHED FORWARD.

## 2023-04-05 ENCOUNTER — TELEPHONE (OUTPATIENT)
Dept: GASTROENTEROLOGY | Facility: CLINIC | Age: 28
End: 2023-04-05
Payer: MEDICAID

## 2023-04-05 NOTE — TELEPHONE ENCOUNTER
----- Message from Mirella Freire MD sent at 4/5/2023  4:00 PM CDT -----  Ok to override to see us first.  Thanks!    Mirella  ----- Message -----  From: Tawana Harris MA  Sent: 4/5/2023   1:54 PM CDT  To: Mirella Freire MD    ED f/u is being requested for Medicaid patient. Patient is experiencing upper right side pain. US result:  The gallbladder is partially contracted.  No gallstones are visualized.     Is it okay to override or should she see general surgery? Please advise, thanks.   ----- Message -----  From: Sasha Murray  Sent: 4/5/2023  12:09 PM CDT  To: [facility] Clinical Support    .Type: Patient Call Back    Who called: Self     What is the request in detail: Requesting to schedule appointment from referral     Can the clinic reply by [facility]? No     Would the patient rather a call back or a response via [facility]? Call back    Best call back number:.[phone] (home)       Additional Information:

## 2023-04-05 NOTE — TELEPHONE ENCOUNTER
----- Message from Sasha Murray sent at 4/5/2023 12:08 PM CDT -----  .Type: Patient Call Back    Who called: Self     What is the request in detail: Requesting to schedule appointment from referral     Can the clinic reply by [facility]? No     Would the patient rather a call back or a response via [facility]? Call back    Best call back number:.[phone] (home)       Additional Information:         Referring Physician (Optional): Maryam Morales PA-C

## 2023-04-06 ENCOUNTER — OFFICE VISIT (OUTPATIENT)
Dept: GASTROENTEROLOGY | Facility: CLINIC | Age: 28
End: 2023-04-06
Payer: MEDICAID

## 2023-04-06 ENCOUNTER — TELEPHONE (OUTPATIENT)
Dept: ENDOSCOPY | Facility: HOSPITAL | Age: 28
End: 2023-04-06
Payer: MEDICAID

## 2023-04-06 ENCOUNTER — LAB VISIT (OUTPATIENT)
Dept: LAB | Facility: HOSPITAL | Age: 28
End: 2023-04-06
Attending: INTERNAL MEDICINE
Payer: MEDICAID

## 2023-04-06 VITALS
SYSTOLIC BLOOD PRESSURE: 128 MMHG | BODY MASS INDEX: 23.64 KG/M2 | HEIGHT: 62 IN | DIASTOLIC BLOOD PRESSURE: 62 MMHG | HEART RATE: 80 BPM | WEIGHT: 128.44 LBS

## 2023-04-06 DIAGNOSIS — R10.9 ABDOMINAL CRAMPING: ICD-10-CM

## 2023-04-06 DIAGNOSIS — R19.7 DIARRHEA, UNSPECIFIED TYPE: Primary | ICD-10-CM

## 2023-04-06 DIAGNOSIS — R10.11 RIGHT UPPER QUADRANT PAIN: ICD-10-CM

## 2023-04-06 DIAGNOSIS — R10.13 EPIGASTRIC PAIN: ICD-10-CM

## 2023-04-06 DIAGNOSIS — R10.9 ABDOMINAL PAIN, UNSPECIFIED ABDOMINAL LOCATION: ICD-10-CM

## 2023-04-06 DIAGNOSIS — R19.7 DIARRHEA, UNSPECIFIED TYPE: ICD-10-CM

## 2023-04-06 LAB
CRP SERPL-MCNC: 1.1 MG/L (ref 0–8.2)
FERRITIN SERPL-MCNC: 22 NG/ML (ref 20–300)
IRON SERPL-MCNC: 124 UG/DL (ref 30–160)
SATURATED IRON: 31 % (ref 20–50)
TOTAL IRON BINDING CAPACITY: 401 UG/DL (ref 250–450)
TRANSFERRIN SERPL-MCNC: 271 MG/DL (ref 200–375)

## 2023-04-06 PROCEDURE — 86140 C-REACTIVE PROTEIN: CPT | Performed by: INTERNAL MEDICINE

## 2023-04-06 PROCEDURE — 99204 PR OFFICE/OUTPT VISIT, NEW, LEVL IV, 45-59 MIN: ICD-10-PCS | Mod: S$PBB,,, | Performed by: INTERNAL MEDICINE

## 2023-04-06 PROCEDURE — 1159F MED LIST DOCD IN RCRD: CPT | Mod: CPTII,,, | Performed by: INTERNAL MEDICINE

## 2023-04-06 PROCEDURE — 3074F PR MOST RECENT SYSTOLIC BLOOD PRESSURE < 130 MM HG: ICD-10-PCS | Mod: CPTII,,, | Performed by: INTERNAL MEDICINE

## 2023-04-06 PROCEDURE — 3074F SYST BP LT 130 MM HG: CPT | Mod: CPTII,,, | Performed by: INTERNAL MEDICINE

## 2023-04-06 PROCEDURE — 3008F BODY MASS INDEX DOCD: CPT | Mod: CPTII,,, | Performed by: INTERNAL MEDICINE

## 2023-04-06 PROCEDURE — 82728 ASSAY OF FERRITIN: CPT | Performed by: INTERNAL MEDICINE

## 2023-04-06 PROCEDURE — 1160F RVW MEDS BY RX/DR IN RCRD: CPT | Mod: CPTII,,, | Performed by: INTERNAL MEDICINE

## 2023-04-06 PROCEDURE — 36415 COLL VENOUS BLD VENIPUNCTURE: CPT | Performed by: INTERNAL MEDICINE

## 2023-04-06 PROCEDURE — 86364 TISS TRNSGLTMNASE EA IG CLAS: CPT | Performed by: INTERNAL MEDICINE

## 2023-04-06 PROCEDURE — 3078F PR MOST RECENT DIASTOLIC BLOOD PRESSURE < 80 MM HG: ICD-10-PCS | Mod: CPTII,,, | Performed by: INTERNAL MEDICINE

## 2023-04-06 PROCEDURE — 1160F PR REVIEW ALL MEDS BY PRESCRIBER/CLIN PHARMACIST DOCUMENTED: ICD-10-PCS | Mod: CPTII,,, | Performed by: INTERNAL MEDICINE

## 2023-04-06 PROCEDURE — 99999 PR PBB SHADOW E&M-EST. PATIENT-LVL V: CPT | Mod: PBBFAC,,, | Performed by: INTERNAL MEDICINE

## 2023-04-06 PROCEDURE — 99999 PR PBB SHADOW E&M-EST. PATIENT-LVL V: ICD-10-PCS | Mod: PBBFAC,,, | Performed by: INTERNAL MEDICINE

## 2023-04-06 PROCEDURE — 3008F PR BODY MASS INDEX (BMI) DOCUMENTED: ICD-10-PCS | Mod: CPTII,,, | Performed by: INTERNAL MEDICINE

## 2023-04-06 PROCEDURE — 3078F DIAST BP <80 MM HG: CPT | Mod: CPTII,,, | Performed by: INTERNAL MEDICINE

## 2023-04-06 PROCEDURE — 1159F PR MEDICATION LIST DOCUMENTED IN MEDICAL RECORD: ICD-10-PCS | Mod: CPTII,,, | Performed by: INTERNAL MEDICINE

## 2023-04-06 PROCEDURE — 99204 OFFICE O/P NEW MOD 45 MIN: CPT | Mod: S$PBB,,, | Performed by: INTERNAL MEDICINE

## 2023-04-06 PROCEDURE — 84466 ASSAY OF TRANSFERRIN: CPT | Performed by: INTERNAL MEDICINE

## 2023-04-06 PROCEDURE — 99215 OFFICE O/P EST HI 40 MIN: CPT | Mod: PBBFAC | Performed by: INTERNAL MEDICINE

## 2023-04-06 RX ORDER — DICYCLOMINE HYDROCHLORIDE 20 MG/1
20 TABLET ORAL EVERY 6 HOURS PRN
Qty: 120 TABLET | Refills: 1 | Status: SHIPPED | OUTPATIENT
Start: 2023-04-06 | End: 2023-05-06

## 2023-04-06 RX ORDER — PANTOPRAZOLE SODIUM 40 MG/1
40 TABLET, DELAYED RELEASE ORAL DAILY
Qty: 30 TABLET | Refills: 11 | Status: SHIPPED | OUTPATIENT
Start: 2023-04-06 | End: 2024-04-05

## 2023-04-06 NOTE — TELEPHONE ENCOUNTER
" EGD and colonoscopy  Received: Today  Mirella Freire MD  [facility] Patients  Procedure: EGD/Colonoscopy     Diagnosis: Diarrhea and Abdominal pain     Procedure Timin-4 weeks     *If within 4 weeks selected, please fatimah as high priority*     *If greater than 12 weeks, please select "4-12 weeks" and delay sending until 2 months prior to requested date*     Provider: Myself     Location: [facility]     Additional Scheduling Information: No scheduling concerns     Prep Specifications:Standard prep     Have you attached a patient to this message: yes   "

## 2023-04-06 NOTE — H&P (VIEW-ONLY)
[facility]    CC: abdominal pain    HPI 28 y.o. female who presents for evaluation of several weeks of mid epigastric, now constant, sharp stabbing abdominal pain with previous nausea (now resolved), dark stools without vomiting.    She also has chronic, daily diarrhea.  She has 10 episodes daily which are liquid.  She has not tried any medications to slow her stool down.  She has painful abdominal cramping associated with her diarrhea.  She has an aunt with celiac disease.    She does have graves disease and has been lost to follow up from endocrinology but is now re established.  She has lost 100lbs over the past year that was unintentional.      Physical Examination  /62   Pulse 80   Ht 5' 2" (1.575 m)   Wt 58.3 kg (128 lb 6.7 oz)   LMP 03/05/2023 (Exact Date)   BMI 23.49 kg/m²   General appearance: alert, cooperative, no distress  HENT: Normocephalic, atraumatic, neck symmetrical, no nasal discharge   Lungs: clear to auscultation bilaterally, no dullness to percussion bilaterally  Heart: regular rate and rhythm without rub; no displacement of the PMI   Abdomen: soft, tender to palpation in the epigastrium, no rebound or guarding bowel sounds normoactive; no organomegaly  Neurologic: Alert and oriented X 3, moving all four extremities, intact sensation to light touch    Labs:  Lab Results   Component Value Date    WBC 5.3 01/25/2022    HGB 11.3 01/25/2022    HCT 36.9 01/25/2022    MCV 81 01/25/2022     01/25/2022         CMP  Sodium   Date Value Ref Range Status   01/25/2022 140 134 - 144 mmol/L Final     Potassium   Date Value Ref Range Status   01/25/2022 4.3 3.5 - 5.2 mmol/L Final     Chloride   Date Value Ref Range Status   01/25/2022 105 96 - 106 mmol/L Final     CO2   Date Value Ref Range Status   01/25/2022 22 20 - 29 mmol/L Final     Glucose   Date Value Ref Range Status   01/25/2022 88 65 - 99 mg/dL Final     BUN   Date Value Ref Range Status   01/25/2022 7 6 - 20 " mg/dL Final     Creatinine   Date Value Ref Range Status   01/25/2022 0.45 (L) 0.57 - 1.00 mg/dL Final     Calcium   Date Value Ref Range Status   01/25/2022 9.7 8.7 - 10.2 mg/dL Final     Total Protein   Date Value Ref Range Status   10/01/2016 7.5 6.0 - 8.5 g/dL Final     Albumin   Date Value Ref Range Status   01/25/2022 4.1 3.9 - 5.0 g/dL Final   10/01/2016 4.6 3.5 - 5.5 g/dL Final     Total Bilirubin   Date Value Ref Range Status   01/25/2022 0.2 0.0 - 1.2 mg/dL Final     Alkaline Phosphatase   Date Value Ref Range Status   10/01/2016 80 39 - 117 IU/L Final     AST   Date Value Ref Range Status   01/25/2022 20 0 - 40 IU/L Final     ALT   Date Value Ref Range Status   01/25/2022 20 0 - 32 IU/L Final     Anion Gap   Date Value Ref Range Status   05/25/2014 11 8 - 16 mmol/L Final         Assessment:   The patient is a 29 yo female with Graves disease who presents with new worsening epigastric pain, nausea, dark stools as well as chronic, severe diarrhea and 100lb weight loss over the past year.    Plan:  -Continue to follow with endocrine for your graves disease.  -Check stool studies, celiac panel, iron studies, CRP.  -Ok to start Imodium as needed for diarrhea.  -Start bentyl as needed for abdominal cramping.  -Start Protonix 40mg once per day for abdominal pain.  -Schedule EGD and colonoscopy to further evaluate her symptoms.    Mirella Freire MD

## 2023-04-06 NOTE — PROGRESS NOTES
"[facility]    CC: abdominal pain    HPI 28 y.o. female who presents for evaluation of several weeks of mid epigastric, now constant, sharp stabbing abdominal pain with previous nausea (now resolved), dark stools without vomiting.    She also has chronic, daily diarrhea.  She has 10 episodes daily which are liquid.  She has not tried any medications to slow her stool down.  She has painful abdominal cramping associated with her diarrhea.  She has an aunt with celiac disease.    She does have graves disease and has been lost to follow up from endocrinology but is now re established.  She has lost 100lbs over the past year that was unintentional.      Physical Examination  /62   Pulse 80   Ht 5' 2" (1.575 m)   Wt 58.3 kg (128 lb 6.7 oz)   LMP 03/05/2023 (Exact Date)   BMI 23.49 kg/m²   General appearance: alert, cooperative, no distress  HENT: Normocephalic, atraumatic, neck symmetrical, no nasal discharge   Lungs: clear to auscultation bilaterally, no dullness to percussion bilaterally  Heart: regular rate and rhythm without rub; no displacement of the PMI   Abdomen: soft, tender to palpation in the epigastrium, no rebound or guarding bowel sounds normoactive; no organomegaly  Neurologic: Alert and oriented X 3, moving all four extremities, intact sensation to light touch    Labs:  Lab Results   Component Value Date    WBC 5.3 01/25/2022    HGB 11.3 01/25/2022    HCT 36.9 01/25/2022    MCV 81 01/25/2022     01/25/2022         CMP  Sodium   Date Value Ref Range Status   01/25/2022 140 134 - 144 mmol/L Final     Potassium   Date Value Ref Range Status   01/25/2022 4.3 3.5 - 5.2 mmol/L Final     Chloride   Date Value Ref Range Status   01/25/2022 105 96 - 106 mmol/L Final     CO2   Date Value Ref Range Status   01/25/2022 22 20 - 29 mmol/L Final     Glucose   Date Value Ref Range Status   01/25/2022 88 65 - 99 mg/dL Final     BUN   Date Value Ref Range Status   01/25/2022 7 6 - 20 " mg/dL Final     Creatinine   Date Value Ref Range Status   01/25/2022 0.45 (L) 0.57 - 1.00 mg/dL Final     Calcium   Date Value Ref Range Status   01/25/2022 9.7 8.7 - 10.2 mg/dL Final     Total Protein   Date Value Ref Range Status   10/01/2016 7.5 6.0 - 8.5 g/dL Final     Albumin   Date Value Ref Range Status   01/25/2022 4.1 3.9 - 5.0 g/dL Final   10/01/2016 4.6 3.5 - 5.5 g/dL Final     Total Bilirubin   Date Value Ref Range Status   01/25/2022 0.2 0.0 - 1.2 mg/dL Final     Alkaline Phosphatase   Date Value Ref Range Status   10/01/2016 80 39 - 117 IU/L Final     AST   Date Value Ref Range Status   01/25/2022 20 0 - 40 IU/L Final     ALT   Date Value Ref Range Status   01/25/2022 20 0 - 32 IU/L Final     Anion Gap   Date Value Ref Range Status   05/25/2014 11 8 - 16 mmol/L Final         Assessment:   The patient is a 29 yo female with Graves disease who presents with new worsening epigastric pain, nausea, dark stools as well as chronic, severe diarrhea and 100lb weight loss over the past year.    Plan:  -Continue to follow with endocrine for your graves disease.  -Check stool studies, celiac panel, iron studies, CRP.  -Ok to start Imodium as needed for diarrhea.  -Start bentyl as needed for abdominal cramping.  -Start Protonix 40mg once per day for abdominal pain.  -Schedule EGD and colonoscopy to further evaluate her symptoms.    Mirella Freire MD

## 2023-04-10 LAB
GLIADIN PEPTIDE IGA SER-ACNC: 2.1 U/ML
GLIADIN PEPTIDE IGG SER-ACNC: <0.6 U/ML
IGA SERPL-MCNC: 214 MG/DL (ref 70–400)
TTG IGA SER-ACNC: 0.4 U/ML
TTG IGG SER-ACNC: <0.6 U/ML

## 2023-04-17 ENCOUNTER — HOSPITAL ENCOUNTER (EMERGENCY)
Facility: HOSPITAL | Age: 28
Discharge: HOME | End: 2023-04-17
Attending: EMERGENCY MEDICINE
Payer: MEDICAID

## 2023-04-17 VITALS
HEART RATE: 95 BPM | RESPIRATION RATE: 18 BRPM | WEIGHT: 129 LBS | BODY MASS INDEX: 23.74 KG/M2 | HEIGHT: 62 IN | OXYGEN SATURATION: 99 % | TEMPERATURE: 99 F | DIASTOLIC BLOOD PRESSURE: 54 MMHG | SYSTOLIC BLOOD PRESSURE: 111 MMHG

## 2023-04-17 DIAGNOSIS — M25.511 RIGHT SHOULDER PAIN: Primary | ICD-10-CM

## 2023-04-17 LAB
B-HCG UR QL: NEGATIVE
CTP QC/QA: YES

## 2023-04-17 PROCEDURE — 25000003 PHARM REV CODE 250: Mod: ER | Performed by: NURSE PRACTITIONER

## 2023-04-17 PROCEDURE — 81025 URINE PREGNANCY TEST: CPT | Mod: ER | Performed by: NURSE PRACTITIONER

## 2023-04-17 PROCEDURE — 99283 EMERGENCY DEPT VISIT LOW MDM: CPT | Mod: ER

## 2023-04-17 RX ORDER — HYDROCODONE BITARTRATE AND ACETAMINOPHEN 5; 325 MG/1; MG/1
1 TABLET ORAL
Status: COMPLETED | OUTPATIENT
Start: 2023-04-17 | End: 2023-04-17

## 2023-04-17 RX ORDER — ACETAMINOPHEN 500 MG
1000 TABLET ORAL EVERY 8 HOURS PRN
Qty: 20 TABLET | Refills: 0 | Status: SHIPPED | OUTPATIENT
Start: 2023-04-17

## 2023-04-17 RX ORDER — ACETAMINOPHEN 500 MG
1000 TABLET ORAL EVERY 8 HOURS PRN
Qty: 20 TABLET | Refills: 0 | Status: SHIPPED | OUTPATIENT
Start: 2023-04-17 | End: 2023-04-17 | Stop reason: SDUPTHER

## 2023-04-17 RX ADMIN — HYDROCODONE BITARTRATE AND ACETAMINOPHEN 1 TABLET: 5; 325 TABLET ORAL at 07:04

## 2023-04-17 NOTE — ED TRIAGE NOTES
Pt states she went to sit on chair and fell hitting her right shoulder on the counter. Pt has a 10/10 pain. Pt is alert and oriented x4.

## 2023-04-18 NOTE — DISCHARGE INSTRUCTIONS
Thank you for coming to our Emergency Department today. It is important to remember that some problems or medical conditions are difficult to diagnose and may not be found during your Emergency Department visit.     Be sure to follow up with your primary care doctor and review all labs/imaging/tests that were performed during your ER visit with them. Some labs/tests may be outside of the normal range and require non-emergent follow-up and further investigation to help diagnose/exclude/prevent complications or other potentially serious medical conditions that were not addressed during your ER visit.    If you do not have a primary care doctor, you may contact the one listed on your discharge paperwork or you may also call the [facility] Appointment Desk at [phone] to schedule an appointment and establish care with one. It is important to your health that you have a primary care doctor.    Please take all medications as directed. All medications may potentially have side-effects and it is impossible to predict which medications may give you side-effects or what side-effects (if any) they will give you.. If you feel that you are having a negative effect or side-effect of any medication you should immediately stop taking them and seek medical attention. If you feel that you are having a life-threatening reaction call 911.    Return to the ER with any questions/concerns, new/concerning symptoms, worsening or failure to improve.     Do not drive, swim, climb to height, take a bath, operate heavy machinery, drink alcohol or take potentially sedating medications, sign any legal documents or make any important decisions for 24 hours if you have received any pain medications, sedatives or mood altering drugs during your ER visit or within 24 hours of taking them if they have been prescribed to you.     You can find additional resources for Dentists, hearing aids, durable medical equipment, low cost pharmacies and  other resources at https://[facility].org    BELOW THIS LINE ONLY APPLIES IF YOU HAVE A COVID TEST PENDING OR IF YOU HAVE BEEN DIAGNOSED WITH COVID:  Please access [facility] to review the results of your test. Until the results of your COVID test return, you should isolate yourself so as not to potentially spread illness to others.   If your COVID test returns positive, you should isolate yourself so as not to spread illness to others. After five full days, if you are feeling better and you have not had fever for 24 hours, you can return to your typical daily activities, but you must wear a mask around others for an additional 5 days.   If your COVID test returns negative and you are either unvaccinated or more than six months out from your two-dose vaccine and are not yet boosted, you should still quarantine for 5 full days followed by strict mask use for an additional 5 full days.   If your COVID test returns negative and you have received your 2-dose initial vaccine as well as a booster, you should continue strict mask use for 10 full days after the exposure.  For all those exposed, best practice includes a test at day 5 after the exposure. This can be a home test or a test through one of the many testing centers throughout our community.   Masking is always advised to limit the spread of COVID. Cdc.gov is an excellent site to obtain the latest up to date recommendations regarding COVID and COVID testing.     CDC Testing and Quarantine Guidelines for patients with exposure to a known-positive COVID-19 person:  A close exposure is defined as anyone who has had an exposure (masked or unmasked) to a known COVID -19 positive person within 6 feet of someone for a cumulative total of 15 minutes or more over a 24-hour period.   Vaccinated and/or if you recently had a positive covid test within 90 days do NOT need to quarantine after contact with someone who had COVID-19 unless you develop symptoms.   Fully vaccinated  people who have not had a positive test within 90 days, should get tested 3-5 days after their exposure, even if they don't have symptoms and wear a mask indoors in public for 14 days following exposure or until their test result is negative.      Unvaccinated and/or NOT had a positive test within 90 days and meet close exposure  You are required by CDC guidelines to quarantine for at least 5 days from time of exposure followed by 5 days of strict masking. It is recommended, but not required to test after 5 days, unless you develop symptoms, in which case you should test at that time.  If you get tested after 5 days and your test is positive, your 5 day period of isolation starts the day of the positive test.    If your exposure does not meet the above definition, you can return to your normal daily activities to include social distancing, wearing a mask and frequent handwashing.      Here is a link to guidance from the CDC:  https://www.cdc.gov/media/releases/2021/s1227-isolation-quarantine-guidance.html      [place] Dept Of Health Testing Sites:  https://ldh.la.gov/page/3934      [facility] website with testing locations and guidance:  [email or web address].[email or web address]

## 2023-04-18 NOTE — ED PROVIDER NOTES
Encounter Date: 4/17/2023    SCRIBE #1 NOTE: I, Arlene Mayorga, am scribing for, and in the presence of,  Ana Luisa Portillo NP. I have scribed the following portions of the note - Other sections scribed: HPI, ROS.     History     Chief Complaint   Patient presents with    Arm Pain     Delvin Contreras, a 28 y.o. female presents to the ED via PV with CC of R arm pain after hitting her arm on the countertop on yesterday. Pt has active ROM in R arm and wrist, rates pain 7/10. Pt does have pmhx of RA, Grave's disease, and tachycardia.         CC: right shoulder pain  This is a 28 y.o. female with no pertinent PMHx who presents to the ED for right shoulder pain onset 1 day ago. Pt reports falling out of her chair and hitting her shoulder on the counter. Pt reports attempting to relieve her symptoms with Tylenol. She reports the last time she took tylenol was 1 day ago.    The history is provided by the patient. No  was used.   Review of patient's allergies indicates:   Allergen Reactions    No known drug allergies      Past Medical History:   Diagnosis Date    Anemia     Anxiety     AR (allergic rhinitis)     Graves disease     Irregular menses     Obesity     Tobacco abuse      Past Surgical History:   Procedure Laterality Date    APPENDECTOMY      TONSILLECTOMY       Family History   Problem Relation Age of Onset    Hyperlipidemia Mother     Seizures Mother         Mom had seizures as a child.     Diabetes Father     Heart disease Father     Hypertension Father     Hyperlipidemia Father     Alcohol abuse Maternal Uncle     Drug abuse Maternal Uncle     Arthritis Paternal Aunt     Stroke Paternal Aunt     Early death Paternal Uncle 46        heart attack    Alcohol abuse Maternal Grandmother     Diabetes Maternal Grandmother     Early death Maternal Grandmother 41        heart attack    Seizures Maternal Grandmother     Diabetes Maternal Grandfather     Diabetes Paternal Grandmother      Social History      Tobacco Use    Smoking status: Former     Packs/day: 1.00     Types: Cigarettes     Quit date: 2014     Years since quittin.9    Smokeless tobacco: Never   Substance Use Topics    Alcohol use: Yes     Comment: RARELY    Drug use: No     Review of Systems   Constitutional:  Negative for activity change, appetite change, chills, fatigue and fever.   HENT:  Negative for congestion, sore throat, trouble swallowing and voice change.    Eyes:  Negative for photophobia, pain, redness and visual disturbance.   Respiratory:  Negative for cough, chest tightness, shortness of breath and wheezing.    Cardiovascular:  Negative for chest pain, palpitations and leg swelling.   Gastrointestinal:  Negative for abdominal distention, abdominal pain, anal bleeding, constipation, diarrhea, nausea and vomiting.   Endocrine: Negative for polydipsia, polyphagia and polyuria.   Genitourinary:  Negative for difficulty urinating, dysuria, frequency, hematuria, urgency, vaginal bleeding and vaginal discharge.   Musculoskeletal:  Positive for arthralgias (right shoulder). Negative for myalgias.   Skin:  Negative for rash and wound.   Neurological:  Negative for dizziness, weakness, light-headedness and headaches.   Hematological:  Negative for adenopathy.     Physical Exam     Initial Vitals   BP Pulse Resp Temp SpO2   23 1700 23 1700 23 1700 23 1701 23 1700   135/77 103 20 98.9 °F (37.2 °C) 97 %      MAP       --                Physical Exam    Constitutional: She appears well-developed and well-nourished. She is not diaphoretic. No distress.   HENT:   Head: Normocephalic and atraumatic.   Neck:   Normal range of motion.  Cardiovascular:  Intact distal pulses.           Pulmonary/Chest: No respiratory distress.   Musculoskeletal:         General: Normal range of motion.      Right shoulder: Tenderness present. No swelling or deformity. Normal range of motion.      Right elbow: Normal.      Right  wrist: Normal.      Cervical back: Normal and normal range of motion. No tenderness.      Thoracic back: Normal.      Lumbar back: Normal.     Neurological: She is alert and oriented to person, place, and time.   Skin: Skin is warm and dry.   Psychiatric: She has a normal mood and affect. Her behavior is normal.       ED Course   Procedures  Labs Reviewed   POCT URINE PREGNANCY          Imaging Results              X-Ray Shoulder Trauma Right (Final result)  Result time 04/17/23 19:13:26      Final result by Avery Orantes MD (04/17/23 19:13:26)                   Impression:      No acute fracture or dislocation.      Electronically signed by: Avery Orantes  Date:    04/17/2023  Time:    19:13               Narrative:    EXAMINATION:  XR SHOULDER TRAUMA 3 VIEW RIGHT    CLINICAL HISTORY:  Pain in right shoulder    TECHNIQUE:  Three or four views of the right shoulder were performed.    COMPARISON:  None    FINDINGS:  No acute fracture, dislocation, or traumatic malalignment.  Joint spaces are maintained.                                       Medications   HYDROcodone-acetaminophen 5-325 mg per tablet 1 tablet (1 tablet Oral Given 4/17/23 1917)     Medical Decision Making:   History:   Old Medical Records: I decided to obtain old medical records.  Differential Diagnosis:   Fracture, dislocation, sprain, strain, contusion, others  Clinical Tests:   Lab Tests: Ordered and Reviewed  Radiological Study: Ordered and Reviewed  ED Management:  28-year-old female presenting to ED for evaluation of right shoulder pain.  Full physical exam as above.  No findings concerning for infection or neurovascular compromise.  X-ray negative for fracture dislocation.  Will discharge home.  Follow up with PCP or orthopedics for further evaluation and treatment.    Based on my clinical evaluation, I do not appreciate any immediate, emergent, or life threatening condition or etiology that warrants additional workup today.  I feel  the patient can be discharged with close follow-up care.         Scribe Attestation:   Scribe #1: I performed the above scribed service and the documentation accurately describes the services I performed. I attest to the accuracy of the note.                  Scribe attestation: I, SHERONTyrone Portillo, personally performed the services described in this documentation. All medical record entries made by the scribe were at my direction and in my presence.  I have reviewed the chart and agree that the record reflects my personal performance and is accurate and complete.   Clinical Impression:   Final diagnoses:  [M25.511] Right shoulder pain (Primary)        ED Disposition Condition    Discharge Stable          ED Prescriptions       Medication Sig Dispense Start Date End Date Auth. Provider    acetaminophen (TYLENOL) 500 MG tablet  (Status: Discontinued) Take 2 tablets (1,000 mg total) by mouth every 8 (eight) hours as needed for Pain. 20 tablet 4/17/2023 4/17/2023 Ana Luisa Portillo NP    acetaminophen (TYLENOL) 500 MG tablet Take 2 tablets (1,000 mg total) by mouth every 8 (eight) hours as needed for Pain. 20 tablet 4/17/2023 -- Ana Luisa Portillo NP          Follow-up Information       Follow up With Specialties Details Why Contact Info    Wilmar Fang MD General Practice Schedule an appointment as soon as possible for a visit  For follow-up [address] [place] [address]  [phone]      Zuleima Wolfe MD Orthopedic Surgery Schedule an appointment as soon as possible for a visit  For follow-up [address] [place] [address]  [phone]      [facility] Surgical Oncology, Orthopedic Surgery, Genetics, Physical Medicine and Rehabilitation, Occupational Therapy, Radiology Schedule an appointment as soon as possible for a visit  For follow-up----orthopedics [address] [facility] [place] [address]  [phone]      [facility] ED Emergency Medicine Go to  If symptoms  worsen [address] [facility] [address]  [phone]             Ana Luisa Portillo, CELSA  04/17/23 2004

## 2023-04-21 ENCOUNTER — LAB VISIT (OUTPATIENT)
Dept: LAB | Facility: HOSPITAL | Age: 28
End: 2023-04-21
Attending: INTERNAL MEDICINE
Payer: MEDICAID

## 2023-04-21 DIAGNOSIS — R19.7 DIARRHEA, UNSPECIFIED TYPE: ICD-10-CM

## 2023-04-21 PROCEDURE — 87427 SHIGA-LIKE TOXIN AG IA: CPT | Performed by: INTERNAL MEDICINE

## 2023-04-21 PROCEDURE — 87186 SC STD MICRODIL/AGAR DIL: CPT | Performed by: INTERNAL MEDICINE

## 2023-04-21 PROCEDURE — 87329 GIARDIA AG IA: CPT | Performed by: INTERNAL MEDICINE

## 2023-04-21 PROCEDURE — 87045 FECES CULTURE AEROBIC BACT: CPT | Performed by: INTERNAL MEDICINE

## 2023-04-21 PROCEDURE — 83993 ASSAY FOR CALPROTECTIN FECAL: CPT | Performed by: INTERNAL MEDICINE

## 2023-04-21 PROCEDURE — 82653 EL-1 FECAL QUANTITATIVE: CPT | Performed by: INTERNAL MEDICINE

## 2023-04-21 PROCEDURE — 87046 STOOL CULTR AEROBIC BACT EA: CPT | Mod: 59 | Performed by: INTERNAL MEDICINE

## 2023-04-21 PROCEDURE — 87077 CULTURE AEROBIC IDENTIFY: CPT | Performed by: INTERNAL MEDICINE

## 2023-04-23 LAB
CRYPTOSP AG STL QL IA: NEGATIVE
E COLI SXT1 STL QL IA: NEGATIVE
E COLI SXT2 STL QL IA: NEGATIVE
G LAMBLIA AG STL QL IA: NEGATIVE

## 2023-04-25 LAB — BACTERIA STL CULT: NORMAL

## 2023-04-26 LAB — ELASTASE 1, FECAL: >500 MCG/G

## 2023-04-28 LAB — CALPROTECTIN STL-MCNT: 41.1 MCG/G

## 2023-05-04 ENCOUNTER — ANESTHESIA EVENT (OUTPATIENT)
Dept: ENDOSCOPY | Facility: HOSPITAL | Age: 28
End: 2023-05-04
Payer: MEDICAID

## 2023-05-04 RX ORDER — SODIUM CHLORIDE 9 MG/ML
INJECTION, SOLUTION INTRAVENOUS CONTINUOUS
Status: CANCELLED | OUTPATIENT
Start: 2023-05-04

## 2023-05-05 ENCOUNTER — HOSPITAL ENCOUNTER (OUTPATIENT)
Facility: HOSPITAL | Age: 28
Discharge: HOME | End: 2023-05-05
Attending: INTERNAL MEDICINE | Admitting: INTERNAL MEDICINE
Payer: MEDICAID

## 2023-05-05 ENCOUNTER — ANESTHESIA (OUTPATIENT)
Dept: ENDOSCOPY | Facility: HOSPITAL | Age: 28
End: 2023-05-05
Payer: MEDICAID

## 2023-05-05 VITALS
TEMPERATURE: 98 F | OXYGEN SATURATION: 96 % | DIASTOLIC BLOOD PRESSURE: 66 MMHG | RESPIRATION RATE: 18 BRPM | SYSTOLIC BLOOD PRESSURE: 124 MMHG | HEART RATE: 70 BPM

## 2023-05-05 DIAGNOSIS — R19.7 DIARRHEA: ICD-10-CM

## 2023-05-05 DIAGNOSIS — R19.7 DIARRHEA, UNSPECIFIED TYPE: Primary | ICD-10-CM

## 2023-05-05 PROCEDURE — 88305 TISSUE EXAM BY PATHOLOGIST: CPT | Performed by: PATHOLOGY

## 2023-05-05 PROCEDURE — 43239 EGD BIOPSY SINGLE/MULTIPLE: CPT | Performed by: INTERNAL MEDICINE

## 2023-05-05 PROCEDURE — D9220A PRA ANESTHESIA: Mod: ANES,,, | Performed by: ANESTHESIOLOGY

## 2023-05-05 PROCEDURE — 00813 ANES UPR LWR GI NDSC PX: CPT | Performed by: INTERNAL MEDICINE

## 2023-05-05 PROCEDURE — D9220A PRA ANESTHESIA: ICD-10-PCS | Mod: ANES,,, | Performed by: ANESTHESIOLOGY

## 2023-05-05 PROCEDURE — 63600175 PHARM REV CODE 636 W HCPCS: Performed by: NURSE ANESTHETIST, CERTIFIED REGISTERED

## 2023-05-05 PROCEDURE — 43239 PR EGD, FLEX, W/BIOPSY, SGL/MULTI: ICD-10-PCS | Mod: 51,,, | Performed by: INTERNAL MEDICINE

## 2023-05-05 PROCEDURE — 25000003 PHARM REV CODE 250: Performed by: NURSE ANESTHETIST, CERTIFIED REGISTERED

## 2023-05-05 PROCEDURE — 45380 COLONOSCOPY AND BIOPSY: CPT | Mod: ,,, | Performed by: INTERNAL MEDICINE

## 2023-05-05 PROCEDURE — D9220A PRA ANESTHESIA: ICD-10-PCS | Mod: CRNA,,, | Performed by: NURSE ANESTHETIST, CERTIFIED REGISTERED

## 2023-05-05 PROCEDURE — 88305 TISSUE EXAM BY PATHOLOGIST: ICD-10-PCS | Mod: 26,,, | Performed by: PATHOLOGY

## 2023-05-05 PROCEDURE — 45380 PR COLONOSCOPY,BIOPSY: ICD-10-PCS | Mod: ,,, | Performed by: INTERNAL MEDICINE

## 2023-05-05 PROCEDURE — 43239 EGD BIOPSY SINGLE/MULTIPLE: CPT | Mod: 51,,, | Performed by: INTERNAL MEDICINE

## 2023-05-05 PROCEDURE — 45380 COLONOSCOPY AND BIOPSY: CPT | Performed by: INTERNAL MEDICINE

## 2023-05-05 PROCEDURE — 37000008 HC ANESTHESIA 1ST 15 MINUTES: Performed by: INTERNAL MEDICINE

## 2023-05-05 PROCEDURE — 37000009 HC ANESTHESIA EA ADD 15 MINS: Performed by: INTERNAL MEDICINE

## 2023-05-05 PROCEDURE — 88305 TISSUE EXAM BY PATHOLOGIST: CPT | Mod: 26,,, | Performed by: PATHOLOGY

## 2023-05-05 PROCEDURE — D9220A PRA ANESTHESIA: Mod: CRNA,,, | Performed by: NURSE ANESTHETIST, CERTIFIED REGISTERED

## 2023-05-05 RX ORDER — SODIUM CHLORIDE 9 MG/ML
INJECTION, SOLUTION INTRAVENOUS CONTINUOUS
Status: DISCONTINUED | OUTPATIENT
Start: 2023-05-05 | End: 2023-05-05 | Stop reason: HOSPADM

## 2023-05-05 RX ORDER — LIDOCAINE HYDROCHLORIDE 40 MG/ML
SOLUTION TOPICAL
Status: DISCONTINUED | OUTPATIENT
Start: 2023-05-05 | End: 2023-05-05

## 2023-05-05 RX ORDER — LIDOCAINE HYDROCHLORIDE 20 MG/ML
INJECTION INTRAVENOUS
Status: DISCONTINUED | OUTPATIENT
Start: 2023-05-05 | End: 2023-05-05

## 2023-05-05 RX ORDER — FENTANYL CITRATE 50 UG/ML
INJECTION, SOLUTION INTRAMUSCULAR; INTRAVENOUS
Status: DISCONTINUED | OUTPATIENT
Start: 2023-05-05 | End: 2023-05-05

## 2023-05-05 RX ORDER — LIDOCAINE HYDROCHLORIDE 20 MG/ML
INJECTION, SOLUTION EPIDURAL; INFILTRATION; INTRACAUDAL; PERINEURAL
Status: DISCONTINUED
Start: 2023-05-05 | End: 2023-05-05 | Stop reason: HOSPADM

## 2023-05-05 RX ORDER — PROPOFOL 10 MG/ML
VIAL (ML) INTRAVENOUS
Status: DISCONTINUED | OUTPATIENT
Start: 2023-05-05 | End: 2023-05-05

## 2023-05-05 RX ORDER — FENTANYL CITRATE 50 UG/ML
INJECTION, SOLUTION INTRAMUSCULAR; INTRAVENOUS
Status: COMPLETED
Start: 2023-05-05 | End: 2023-05-05

## 2023-05-05 RX ORDER — PROPOFOL 10 MG/ML
INJECTION, EMULSION INTRAVENOUS
Status: DISCONTINUED
Start: 2023-05-05 | End: 2023-05-05 | Stop reason: HOSPADM

## 2023-05-05 RX ADMIN — FENTANYL CITRATE 50 MCG: 50 INJECTION, SOLUTION INTRAMUSCULAR; INTRAVENOUS at 02:05

## 2023-05-05 RX ADMIN — LIDOCAINE HYDROCHLORIDE 4 ML: 40 SOLUTION TOPICAL at 01:05

## 2023-05-05 RX ADMIN — PROPOFOL 100 MG: 10 INJECTION, EMULSION INTRAVENOUS at 02:05

## 2023-05-05 RX ADMIN — PROPOFOL 80 MG: 10 INJECTION, EMULSION INTRAVENOUS at 02:05

## 2023-05-05 RX ADMIN — PROPOFOL 200 MG: 10 INJECTION, EMULSION INTRAVENOUS at 02:05

## 2023-05-05 RX ADMIN — PROPOFOL 50 MG: 10 INJECTION, EMULSION INTRAVENOUS at 02:05

## 2023-05-05 RX ADMIN — PROPOFOL 150 MG: 10 INJECTION, EMULSION INTRAVENOUS at 02:05

## 2023-05-05 RX ADMIN — SODIUM CHLORIDE: 0.9 INJECTION, SOLUTION INTRAVENOUS at 01:05

## 2023-05-05 RX ADMIN — LIDOCAINE HYDROCHLORIDE 75 MG: 20 INJECTION, SOLUTION INTRAVENOUS at 02:05

## 2023-05-05 NOTE — ANESTHESIA PREPROCEDURE EVALUATION
2023  Delvin Contreras is a 28 y.o., female.  To undergo Procedure(s) (LRB):  COLONOSCOPY (N/A)  EGD (ESOPHAGOGASTRODUODENOSCOPY) (N/A)     Denies CP/SOB/GERD/MI/CVA/URI symptoms.  METS > 4  NPO > 8    Past Medical History:  Past Medical History:   Diagnosis Date    Anemia     Anxiety     AR (allergic rhinitis)     Graves disease     Irregular menses     Obesity     Tobacco abuse        Past Surgical History:  Past Surgical History:   Procedure Laterality Date    APPENDECTOMY      TONSILLECTOMY         Social History:  Social History     Socioeconomic History    Marital status: Single    Number of children: 0   Tobacco Use    Smoking status: Former     Packs/day: 1.00     Types: Cigarettes     Quit date: 2014     Years since quittin.9    Smokeless tobacco: Never   Substance and Sexual Activity    Alcohol use: Yes     Comment: RARELY    Drug use: No    Sexual activity: Yes     Partners: Male     Birth control/protection: None   Social History Narrative    She lives with her mother and brother. They have one dog in the home.                   Medications:  Current Facility-Administered Medications on File Prior to Encounter   Medication Dose Route Frequency Provider Last Rate Last Admin    albuterol inhaler 2 puff  2 puff Inhalation Q20 Min PRN Gregory Helton MD        diphenhydrAMINE injection 25 mg  25 mg Intravenous Once PRN Gregory Helton MD        EPINEPHrine (EPIPEN) 0.3 mg/0.3 mL pen injection 0.3 mg  0.3 mg Intramuscular PRN Gregory Helton MD        methylPREDNISolone sodium succinate injection 40 mg  40 mg Intravenous Once PRN Gregory Helton MD        ondansetron disintegrating tablet 4 mg  4 mg Oral Once PRN Gregory Helton MD        sodium chloride 0.9% 500 mL flush bag   Intravenous PRN Gregory Helton MD        sodium chloride 0.9% flush 10  mL  10 mL Intravenous PRN Gregory Helton MD         Current Outpatient Medications on File Prior to Encounter   Medication Sig Dispense Refill    amoxicillin-clavulanate 875-125mg (AUGMENTIN) 875-125 mg per tablet Take 1 tablet by mouth 2 (two) times daily. (Patient not taking: Reported on 4/6/2023) 14 tablet 0    diazePAM (VALIUM) 10 MG Tab Take by mouth.      dicyclomine (BENTYL) 20 mg tablet Take 1 tablet (20 mg total) by mouth every 6 (six) hours as needed (abdominal cramping). 120 tablet 1    etonogestrel-ethinyl estradiol (NUVARING) 0.12-0.015 mg/24 hr vaginal ring Place 1 each vaginally every 28 days. 1 each 3    folic acid (FOLVITE) 1 MG tablet Take 1,000 mcg by mouth.      folic acid (FOLVITE) 1 MG tablet Take 1 mg by mouth.      HYDROcodone-acetaminophen (NORCO) 7.5-325 mg per tablet Take by mouth.      metFORMIN (GLUCOPHAGE-XR) 500 MG ER 24hr tablet Take 500 mg by mouth.      methIMAzole (TAPAZOLE) 5 MG Tab Take 5 mg by mouth 3 (three) times daily.      methIMAzole (TAPAZOLE) 5 MG Tab Take 5 mg by mouth.      methotrexate 2.5 MG Tab Take 15 mg by mouth.      metoclopramide HCl (REGLAN) 10 MG tablet Take 1 tablet (10 mg total) by mouth every 6 (six) hours as needed. (Patient not taking: Reported on 4/6/2023) 30 tablet 0    ondansetron (ZOFRAN-ODT) 4 MG TbDL Take 4 mg by mouth every 8 (eight) hours as needed.      pantoprazole (PROTONIX) 40 MG tablet Take 1 tablet (40 mg total) by mouth once daily. 30 tablet 11    prenatal vit,gala 74-iron-folic 27 mg iron- 1 mg Tab Take 1 tablet by mouth once daily. 30 tablet 11    propranoloL (INDERAL) 10 MG tablet Take 10 mg by mouth 2 (two) times daily.      propylthiouraciL (PTU) 50 mg Tab Take 50 mg by mouth 3 (three) times daily.      propylthiouraciL (PTU) 50 mg Tab Take 50 mg by mouth.      sumatriptan (IMITREX) 25 MG Tab Take 1 tablet (25 mg total) by mouth every 2 (two) hours as needed (headache). 30 tablet 0       Allergies:  Review of  patient's allergies indicates:   Allergen Reactions    No known drug allergies        Active Problems:  Patient Active Problem List   Diagnosis    Anemia       Diagnostic Studies:   Latest Reference Range & Units 01/25/22 08:02   WBC 3.4 - 10.8 x10E3/uL 5.3   RBC 3.77 - 5.28 x10E6/uL 4.56   Hemoglobin 11.1 - 15.9 g/dL 11.3   Hematocrit 34.0 - 46.6 % 36.9   MCV 79 - 97 fL 81   MCH 26.6 - 33.0 pg 24.8 (L)   MCHC 31.5 - 35.7 g/dL 30.6 (L)   RDW 11.7 - 15.4 % 13.9   Platelets 150 - 450 x10E3/uL 246     24 Hour Vitals:      See Nursing Charting For Additional Vitals      Pre-op Assessment    I have reviewed the Patient Summary Reports.     I have reviewed the Nursing Notes.       Review of Systems  Anesthesia Hx:  No problems with previous Anesthesia   Denies Personal Hx of Anesthesia complications.   Social:  Former Smoker, Social Alcohol Use    EENT/Dental:   chronic allergic rhinitis   Cardiovascular:  Cardiovascular Normal Exercise tolerance: good     Pulmonary:  Pulmonary Normal    Hepatic/GI:  Hepatic/GI Normal    Neurological:  Neurology Normal    Endocrine:   Hyperthyroidism    Psych:   anxiety          Physical Exam  General: Well nourished and Cooperative    Airway:  Mallampati: II   Mouth Opening: Normal  TM Distance: Normal      Dental:  Intact    Chest/Lungs:  Clear to auscultation, Normal Respiratory Rate    Heart:  Rate: Normal  Rhythm: Regular Rhythm        Anesthesia Plan  Type of Anesthesia, risks & benefits discussed:    Anesthesia Type: Gen Natural Airway, MAC, Gen ETT  Intra-op Monitoring Plan: Standard ASA Monitors  Post Op Pain Control Plan: multimodal analgesia  Induction:  IV  Informed Consent: Informed consent signed with the Patient and all parties understand the risks and agree with anesthesia plan.  All questions answered.   ASA Score: 2    Ready For Surgery From Anesthesia Perspective.     .

## 2023-05-05 NOTE — PROVATION PATIENT INSTRUCTIONS
Discharge Summary/Instructions after an Endoscopic Procedure  Patient Name: Delvin Contreras  Patient MRN: [id number]  Patient YOB: 1995  Friday, May 5, 2023  Mirella Freire MD  Dear patient,  As a result of recent federal legislation (The Federal Cures Act), you may   receive lab or pathology results from your procedure in your [facility]   account before your physician is able to contact you. Your physician or   their representative will relay the results to you with their   recommendations at their soonest availability.  Thank you,  RESTRICTIONS:  During your procedure today, you received medications for sedation.  These   medications may affect your judgment, balance and coordination.  Therefore,   for 24 hours, you have the following restrictions:   - DO NOT drive a car, operate machinery, make legal/financial decisions,   sign important papers or drink alcohol.    ACTIVITY:  Today: no heavy lifting, straining or running due to procedural   sedation/anesthesia.  The following day: return to full activity including work.  DIET:  Eat and drink normally unless instructed otherwise.     TREATMENT FOR COMMON SIDE EFFECTS:  - Mild abdominal pain, nausea, belching, bloating or excessive gas:  rest,   eat lightly and use a heating pad.  - Sore Throat: treat with throat lozenges and/or gargle with warm salt   water.  - Because air was used during the procedure, expelling large amounts of air   from your rectum or belching is normal.  - If a bowel prep was taken, you may not have a bowel movement for 1-3 days.    This is normal.  SYMPTOMS TO WATCH FOR AND REPORT TO YOUR PHYSICIAN:  1. Abdominal pain or bloating, other than gas cramps.  2. Chest pain.  3. Back pain.  4. Signs of infection such as: chills or fever occurring within 24 hours   after the procedure.  5. Rectal bleeding, which would show as bright red, maroon, or black stools.   (A tablespoon of blood from the rectum is not serious, especially if    hemorrhoids are present.)  6. Vomiting.  7. Weakness or dizziness.  GO DIRECTLY TO THE NEAREST EMERGENCY ROOM IF YOU HAVE ANY OF THE FOLLOWING:      Difficulty breathing              Chills and/or fever over 101 F   Persistent vomiting and/or vomiting blood   Severe abdominal pain   Severe chest pain   Black, tarry stools   Bleeding- more than one tablespoon   Any other symptom or condition that you feel may need urgent attention  Your doctor recommends these additional instructions:  If any biopsies were taken, your doctors clinic will contact you in 1 to 2   weeks with any results.  - Proceed to colonoscopy.  - Resume previous diet.   - Continue present medications.   - Await pathology results.  For questions, problems or results please call your physician - Mirella Freire MD at Work:  [phone].  [facility] can be reached at [phone]     IF A COMPLICATION OR EMERGENCY SITUATION ARISES AND YOU ARE UNABLE TO REACH   YOUR PHYSICIAN - GO DIRECTLY TO THE EMERGENCY ROOM.  Mirella Freire MD  5/5/2023 2:18:45 PM  This report has been verified and signed electronically.  Dear patient,  As a result of recent federal legislation (The Federal Cures Act), you may   receive lab or pathology results from your procedure in your [facility]   account before your physician is able to contact you. Your physician or   their representative will relay the results to you with their   recommendations at their soonest availability.  Thank you,  PROVATION

## 2023-05-05 NOTE — TRANSFER OF CARE
Anesthesia Transfer of Care Note    Patient: Delvin Contreras    Procedure(s) Performed: Procedure(s) (LRB):  COLONOSCOPY (N/A)  EGD (ESOPHAGOGASTRODUODENOSCOPY) (N/A)    Patient location: GI    Anesthesia Type: general    Transport from OR: Transported from OR on room air with adequate spontaneous ventilation    Post pain: adequate analgesia    Post assessment: no apparent anesthetic complications and tolerated procedure well    Post vital signs: stable    Level of consciousness: lethargic and responds to stimulation    Nausea/Vomiting: no nausea/vomiting    Complications: none    Transfer of care protocol was followed      Last vitals:   Visit Vitals  BP (!) 98/47 (BP Location: Left arm, Patient Position: Lying)   Pulse 84   Temp 36.5 °C (97.7 °F) (Temporal)   Resp 16   SpO2 98%   Breastfeeding No

## 2023-05-05 NOTE — PROVATION PATIENT INSTRUCTIONS
Discharge Summary/Instructions after an Endoscopic Procedure  Patient Name: Delvin Contreras  Patient MRN: [id number]  Patient YOB: 1995  Friday, May 5, 2023  Mirella Freire MD  Dear patient,  As a result of recent federal legislation (The Federal Cures Act), you may   receive lab or pathology results from your procedure in your [facility]   account before your physician is able to contact you. Your physician or   their representative will relay the results to you with their   recommendations at their soonest availability.  Thank you,  RESTRICTIONS:  During your procedure today, you received medications for sedation.  These   medications may affect your judgment, balance and coordination.  Therefore,   for 24 hours, you have the following restrictions:   - DO NOT drive a car, operate machinery, make legal/financial decisions,   sign important papers or drink alcohol.    ACTIVITY:  Today: no heavy lifting, straining or running due to procedural   sedation/anesthesia.  The following day: return to full activity including work.  DIET:  Eat and drink normally unless instructed otherwise.     TREATMENT FOR COMMON SIDE EFFECTS:  - Mild abdominal pain, nausea, belching, bloating or excessive gas:  rest,   eat lightly and use a heating pad.  - Sore Throat: treat with throat lozenges and/or gargle with warm salt   water.  - Because air was used during the procedure, expelling large amounts of air   from your rectum or belching is normal.  - If a bowel prep was taken, you may not have a bowel movement for 1-3 days.    This is normal.  SYMPTOMS TO WATCH FOR AND REPORT TO YOUR PHYSICIAN:  1. Abdominal pain or bloating, other than gas cramps.  2. Chest pain.  3. Back pain.  4. Signs of infection such as: chills or fever occurring within 24 hours   after the procedure.  5. Rectal bleeding, which would show as bright red, maroon, or black stools.   (A tablespoon of blood from the rectum is not serious, especially if    hemorrhoids are present.)  6. Vomiting.  7. Weakness or dizziness.  GO DIRECTLY TO THE NEAREST EMERGENCY ROOM IF YOU HAVE ANY OF THE FOLLOWING:      Difficulty breathing              Chills and/or fever over 101 F   Persistent vomiting and/or vomiting blood   Severe abdominal pain   Severe chest pain   Black, tarry stools   Bleeding- more than one tablespoon   Any other symptom or condition that you feel may need urgent attention  Your doctor recommends these additional instructions:  If any biopsies were taken, your doctors clinic will contact you in 1 to 2   weeks with any results.  - Discharge patient to home.   - Resume previous diet.   - Continue present medications.   - Await pathology results.   - Repeat colonoscopy at the age of 43 for screening purposes.  For questions, problems or results please call your physician - Mirella Freire MD at Work:  [phone].  [facility] can be reached at [phone]     IF A COMPLICATION OR EMERGENCY SITUATION ARISES AND YOU ARE UNABLE TO REACH   YOUR PHYSICIAN - GO DIRECTLY TO THE EMERGENCY ROOM.  Mirella Freire MD  5/5/2023 2:49:16 PM  This report has been verified and signed electronically.  Dear patient,  As a result of recent federal legislation (The Federal Cures Act), you may   receive lab or pathology results from your procedure in your [facility]   account before your physician is able to contact you. Your physician or   their representative will relay the results to you with their   recommendations at their soonest availability.  Thank you,  PROVATION

## 2023-05-08 NOTE — ANESTHESIA POSTPROCEDURE EVALUATION
Anesthesia Post Evaluation    Patient: Delvin Conrteras    Procedure(s) Performed: Procedure(s) (LRB):  COLONOSCOPY (N/A)  EGD (ESOPHAGOGASTRODUODENOSCOPY) (N/A)    Final Anesthesia Type: general      Patient location during evaluation: GI PACU  Patient participation: Yes- Able to Participate  Level of consciousness: awake and alert and oriented  Post-procedure vital signs: reviewed and stable  Pain management: adequate  Airway patency: patent    PONV status at discharge: No PONV  Anesthetic complications: no      Cardiovascular status: hemodynamically stable and blood pressure returned to baseline  Respiratory status: spontaneous ventilation, room air and unassisted  Hydration status: euvolemic  Follow-up not needed.          Vitals Value Taken Time   /66 05/05/23 1521   Temp 36.5 °C (97.7 °F) 05/05/23 1451   Pulse 67 05/05/23 1521   Resp 48 05/05/23 1521   SpO2 95 % 05/05/23 1521   Vitals shown include unvalidated device data.      Event Time   Out of Recovery 15:19:00         Pain/Liza Score: No data recorded

## 2023-05-09 LAB
FINAL PATHOLOGIC DIAGNOSIS: NORMAL
GROSS: NORMAL

## 2023-05-29 ENCOUNTER — TELEPHONE (OUTPATIENT)
Dept: GASTROENTEROLOGY | Facility: CLINIC | Age: 28
End: 2023-05-29
Payer: MEDICAID

## 2023-05-29 NOTE — TELEPHONE ENCOUNTER
Attempted to contact patient.  Dr. Freire would like patient to get scheduled to see her in clinic in 2 months.  No voice message was able to be left due to box being full.

## 2023-05-30 ENCOUNTER — TELEPHONE (OUTPATIENT)
Dept: GASTROENTEROLOGY | Facility: CLINIC | Age: 28
End: 2023-05-30
Payer: MEDICAID

## 2023-05-30 NOTE — TELEPHONE ENCOUNTER
MA attempted to contact patient, but she did not answer. Unable to leave a voicemail. GI appointment scheduled and mailed to address on file.

## 2023-05-30 NOTE — TELEPHONE ENCOUNTER
----- Message from Mirella Freire MD sent at 5/29/2023  3:41 PM CDT -----  Can you schedule Delvin to see me back in clinic in about 2 months?

## 2023-06-28 ENCOUNTER — HOSPITAL ENCOUNTER (EMERGENCY)
Facility: HOSPITAL | Age: 28
Discharge: HOME | End: 2023-06-28
Attending: INTERNAL MEDICINE
Payer: MEDICAID

## 2023-06-28 VITALS
BODY MASS INDEX: 22.86 KG/M2 | DIASTOLIC BLOOD PRESSURE: 82 MMHG | WEIGHT: 125 LBS | HEART RATE: 74 BPM | RESPIRATION RATE: 16 BRPM | OXYGEN SATURATION: 100 % | TEMPERATURE: 98 F | SYSTOLIC BLOOD PRESSURE: 114 MMHG

## 2023-06-28 DIAGNOSIS — J06.9 VIRAL URI WITH COUGH: Primary | ICD-10-CM

## 2023-06-28 LAB
B-HCG UR QL: NEGATIVE
CTP QC/QA: YES
CTP QC/QA: YES
INFLUENZA A ANTIGEN, POC: NEGATIVE
INFLUENZA B ANTIGEN, POC: NEGATIVE
POC RAPID STREP A: NEGATIVE
SARS-COV-2 RDRP RESP QL NAA+PROBE: NEGATIVE

## 2023-06-28 PROCEDURE — 81025 URINE PREGNANCY TEST: CPT | Mod: ER

## 2023-06-28 PROCEDURE — 87635 SARS-COV-2 COVID-19 AMP PRB: CPT | Mod: ER

## 2023-06-28 PROCEDURE — 99284 EMERGENCY DEPT VISIT MOD MDM: CPT | Mod: ER

## 2023-06-28 PROCEDURE — 87804 INFLUENZA ASSAY W/OPTIC: CPT | Mod: 59,ER

## 2023-06-28 RX ORDER — NAPROXEN 500 MG/1
500 TABLET ORAL 2 TIMES DAILY PRN
Qty: 30 TABLET | Refills: 0 | Status: SHIPPED | OUTPATIENT
Start: 2023-06-28

## 2023-06-28 RX ORDER — CETIRIZINE HYDROCHLORIDE 10 MG/1
10 TABLET ORAL DAILY PRN
Qty: 30 TABLET | Refills: 0 | Status: SHIPPED | OUTPATIENT
Start: 2023-06-28

## 2023-06-28 RX ORDER — FLUTICASONE PROPIONATE 50 MCG
1 SPRAY, SUSPENSION (ML) NASAL 2 TIMES DAILY PRN
Qty: 15 G | Refills: 0 | Status: SHIPPED | OUTPATIENT
Start: 2023-06-28

## 2023-06-28 RX ORDER — PROMETHAZINE HYDROCHLORIDE AND DEXTROMETHORPHAN HYDROBROMIDE 6.25; 15 MG/5ML; MG/5ML
5 SYRUP ORAL EVERY 4 HOURS PRN
Qty: 118 ML | Refills: 0 | Status: SHIPPED | OUTPATIENT
Start: 2023-06-28

## 2023-06-28 RX ORDER — BENZONATATE 100 MG/1
100 CAPSULE ORAL 3 TIMES DAILY PRN
Qty: 30 CAPSULE | Refills: 0 | Status: SHIPPED | OUTPATIENT
Start: 2023-06-28

## 2023-06-28 NOTE — ED PROVIDER NOTES
Encounter Date: 6/28/2023    SCRIBE #1 NOTE: I, Richelle Coley, am scribing for, and in the presence of,  Alayna Holdsworth, PA. I have scribed the following portions of the note - Other sections scribed: HPI, ROS.     History     Chief Complaint   Patient presents with    URI     Delvin Contreras, a 28 y.o. female presents to the ED via Pv with CC of body aches, cough, and sneezing onset approx 2 days ago. Pt reports taking tylenol approx 1 hour PTA.         28 y.o. female with PMHx of Tobacco abuse and Graves disease who presents to the ED for chief complaint of intermittently productive cough, sneezing, and generalized myalgias for 1.5 days. She took tylenol with alleviation. Patient's son has a URI and her daughter has strep. Patient additionally endorses sweats/ chills, congestion, sore throat, and chest tightness. She denies rhinorrhea, nausea, vomiting, constipation, SOB, or headache.     The history is provided by the patient. No  was used.   Review of patient's allergies indicates:   Allergen Reactions    No known drug allergies      Past Medical History:   Diagnosis Date    Anemia     Anxiety     AR (allergic rhinitis)     Graves disease     Irregular menses     Obesity     Tobacco abuse      Past Surgical History:   Procedure Laterality Date    APPENDECTOMY      COLONOSCOPY N/A 5/5/2023    Procedure: COLONOSCOPY;  Surgeon: Mirella Freire MD;  Location: [facility];  Service: Endoscopy;  Laterality: N/A;  miralax prep-inst portal-spera only-tb    ESOPHAGOGASTRODUODENOSCOPY N/A 5/5/2023    Procedure: EGD (ESOPHAGOGASTRODUODENOSCOPY);  Surgeon: Mirella Freire MD;  Location: [facility];  Service: Endoscopy;  Laterality: N/A;    TONSILLECTOMY       Family History   Problem Relation Age of Onset    Hyperlipidemia Mother     Seizures Mother         Mom had seizures as a child.     Diabetes Father     Heart disease Father     Hypertension Father     Hyperlipidemia Father     Alcohol abuse  Maternal Uncle     Drug abuse Maternal Uncle     Arthritis Paternal Aunt     Stroke Paternal Aunt     Early death Paternal Uncle 46        heart attack    Alcohol abuse Maternal Grandmother     Diabetes Maternal Grandmother     Early death Maternal Grandmother 41        heart attack    Seizures Maternal Grandmother     Diabetes Maternal Grandfather     Diabetes Paternal Grandmother      Social History     Tobacco Use    Smoking status: Former     Packs/day: 1.00     Types: Cigarettes     Quit date: 2014     Years since quittin.1    Smokeless tobacco: Never   Substance Use Topics    Alcohol use: Yes     Comment: RARELY    Drug use: No     Review of Systems   Constitutional:  Positive for diaphoresis. Negative for chills and fever.   HENT:  Positive for congestion, rhinorrhea, sneezing and sore throat. Negative for ear discharge, ear pain and trouble swallowing.    Respiratory:  Positive for cough and chest tightness. Negative for shortness of breath.    Cardiovascular:  Negative for chest pain.   Gastrointestinal:  Negative for abdominal pain, constipation, diarrhea, nausea and vomiting.   Genitourinary:  Negative for decreased urine volume and dysuria.   Musculoskeletal:  Positive for myalgias (body aches). Negative for back pain.   Skin:  Negative for rash.   Neurological:  Negative for dizziness, weakness, light-headedness and headaches.     Physical Exam     Initial Vitals [23 1400]   BP Pulse Resp Temp SpO2   130/85 90 18 98.3 °F (36.8 °C) 97 %      MAP       --         Physical Exam    Nursing note and vitals reviewed.  Constitutional: Vital signs are normal. She appears well-developed and well-nourished. She is not diaphoretic. She is active. She does not appear ill. No distress.   HENT:   Head: Normocephalic and atraumatic.   Right Ear: External ear normal.   Left Ear: External ear normal.   Nose: Nose normal.   Mouth/Throat: Uvula is midline and mucous membranes are normal. Posterior  oropharyngeal erythema present. No oropharyngeal exudate, posterior oropharyngeal edema or tonsillar abscesses.   Eyes: Conjunctivae, EOM and lids are normal. Pupils are equal, round, and reactive to light. Right eye exhibits no discharge. Left eye exhibits no discharge.   Neck: Phonation normal. Neck supple.   Normal range of motion.   Full passive range of motion without pain.     Cardiovascular:  Normal rate and regular rhythm.           Pulmonary/Chest: Effort normal and breath sounds normal. No respiratory distress.   Abdominal: She exhibits no distension.   Musculoskeletal:         General: Normal range of motion.      Cervical back: Full passive range of motion without pain, normal range of motion and neck supple.     Neurological: She is alert and oriented to person, place, and time. She has normal strength. GCS eye subscore is 4. GCS verbal subscore is 5. GCS motor subscore is 6.   Skin: Skin is dry. Capillary refill takes less than 2 seconds.       ED Course   Procedures  Labs Reviewed   POCT URINE PREGNANCY   SARS-COV-2 RDRP GENE    Narrative:     This test utilizes isothermal nucleic acid amplification technology to detect the SARS-CoV-2 RdRp nucleic acid segment. The analytical sensitivity (limit of detection) is 500 copies/swab.     A POSITIVE result is indicative of the presence of SARS-CoV-2 RNA; clinical correlation with patient history and other diagnostic information is necessary to determine patient infection status.    A NEGATIVE result means that SARS-CoV-2 nucleic acids are not present above the limit of detection. A NEGATIVE result should be treated as presumptive. It does not rule out the possibility of COVID-19 and should not be the sole basis for treatment decisions. If COVID-19 is strongly suspected based on clinical and exposure history, re-testing using an alternate molecular assay should be considered.     This test is only for use under the Food and Drug Administration s Emergency Use  Authorization (EUA).     Commercial kits are provided by [employer]. Performance characteristics of the EUA have been independently verified by [facility] Department of Pathology and Laboratory Medicine.   _________________________________________________________________   The authorized Fact Sheet for Healthcare Providers and the authorized Fact Sheet for Patients of the ID NOW COVID-19 are available on the FDA website:    https://www.fda.gov/media/777103/download      https://www.fda.gov/media/633033/download      POCT INFLUENZA A/B MOLECULAR   POCT STREP A MOLECULAR   POCT STREP A, RAPID   POCT RAPID INFLUENZA A/B          Imaging Results    None          Medications - No data to display  Medical Decision Making:   Initial Assessment:   28 y.o. female with PMHx of Tobacco abuse and Graves disease who presents to the ED for chief complaint of intermittently productive cough.  Patient's chart and medical history reviewed.  Differential Diagnosis:   COVID  Flu  Strep throat  Viral URI  Clinical Tests:   Lab Tests: Reviewed and Ordered  ED Management:  Patient's vitals reviewed.  She is afebrile, no respiratory distress, nontoxic-appearing in the ED. patient erythematous throat on exam, otherwise unremarkable.  Patient denied pain medication.  UPT was negative.  Patient is COVID, flu, and strep negative. Discussed with patient this is most likely a viral upper respiratory infection which will take time to clear from her system.  Discussed with patient to stay well rested and hydrated.  Patient will be sent home with promethazine DM cough syrup, Zyrtec, Flonase, Tessalon Perles, and naproxen for symptomatic control.  Patient will follow-up with her PCP. Patient agrees with this plan. Discussed with her strict return precautions, she verbalized understanding. Patient is stable for discharge.         Scribe Attestation:   Scribe #1: I performed the above scribed service and the documentation  accurately describes the services I performed. I attest to the accuracy of the note.                 I, Alayna Holdsworth,PA-C, personally performed the services described in this documentation.  All medical record entries made by the scribe were at my direction and in my presence.  I have reviewed the chart and agree that the record reflects my personal performance and is accurate and complete.  Clinical Impression:   Final diagnoses:  [J06.9] Viral URI with cough (Primary)        ED Disposition Condition    Discharge Stable          ED Prescriptions       Medication Sig Dispense Start Date End Date Auth. Provider    promethazine-dextromethorphan (PROMETHAZINE-DM) 6.25-15 mg/5 mL Syrp Take 5 mLs by mouth every 4 (four) hours as needed (cough/congestion). 118 mL 6/28/2023 -- Alayna Holdsworth, PA-C    cetirizine (ZYRTEC) 10 MG tablet Take 1 tablet (10 mg total) by mouth daily as needed for Allergies or Rhinitis. 30 tablet 6/28/2023 -- Alayna Holdsworth, PA-C    fluticasone propionate (FLONASE) 50 mcg/actuation nasal spray 1 spray (50 mcg total) by Each Nostril route 2 (two) times daily as needed for Rhinitis or Allergies. 15 g 6/28/2023 -- Alayna Holdsworth, PA-C    benzonatate (TESSALON) 100 MG capsule Take 1 capsule (100 mg total) by mouth 3 (three) times daily as needed for Cough. 30 capsule 6/28/2023 -- Alayna Holdsworth, PA-C    naproxen (NAPROSYN) 500 MG tablet Take 1 tablet (500 mg total) by mouth 2 (two) times daily as needed (Pain). 30 tablet 6/28/2023 -- Alayna Holdsworth, PA-C          Follow-up Information       Follow up With Specialties Details Why Contact Info    Wilmar Fang MD General Practice   [address] [place] [address]  [place] [id number] [phone]               Alayna Holdsworth, PA-C  06/28/23 4520

## 2023-06-28 NOTE — DISCHARGE INSTRUCTIONS

## 2023-09-14 ENCOUNTER — OFFICE VISIT (OUTPATIENT)
Dept: GASTROENTEROLOGY | Facility: CLINIC | Age: 28
End: 2023-09-14
Payer: MEDICAID

## 2023-09-14 VITALS
HEART RATE: 94 BPM | SYSTOLIC BLOOD PRESSURE: 144 MMHG | DIASTOLIC BLOOD PRESSURE: 98 MMHG | WEIGHT: 128.19 LBS | HEIGHT: 62 IN | BODY MASS INDEX: 23.59 KG/M2

## 2023-09-14 DIAGNOSIS — R19.7 DIARRHEA, UNSPECIFIED TYPE: Primary | ICD-10-CM

## 2023-09-14 DIAGNOSIS — R10.9 ABDOMINAL PAIN, UNSPECIFIED ABDOMINAL LOCATION: ICD-10-CM

## 2023-09-14 PROCEDURE — 3080F PR MOST RECENT DIASTOLIC BLOOD PRESSURE >= 90 MM HG: ICD-10-PCS | Mod: CPTII,,, | Performed by: INTERNAL MEDICINE

## 2023-09-14 PROCEDURE — 99213 OFFICE O/P EST LOW 20 MIN: CPT | Mod: PBBFAC | Performed by: INTERNAL MEDICINE

## 2023-09-14 PROCEDURE — 1159F PR MEDICATION LIST DOCUMENTED IN MEDICAL RECORD: ICD-10-PCS | Mod: CPTII,,, | Performed by: INTERNAL MEDICINE

## 2023-09-14 PROCEDURE — 1160F RVW MEDS BY RX/DR IN RCRD: CPT | Mod: CPTII,,, | Performed by: INTERNAL MEDICINE

## 2023-09-14 PROCEDURE — 1159F MED LIST DOCD IN RCRD: CPT | Mod: CPTII,,, | Performed by: INTERNAL MEDICINE

## 2023-09-14 PROCEDURE — 3008F PR BODY MASS INDEX (BMI) DOCUMENTED: ICD-10-PCS | Mod: CPTII,,, | Performed by: INTERNAL MEDICINE

## 2023-09-14 PROCEDURE — 99213 PR OFFICE/OUTPT VISIT, EST, LEVL III, 20-29 MIN: ICD-10-PCS | Mod: S$PBB,,, | Performed by: INTERNAL MEDICINE

## 2023-09-14 PROCEDURE — 99213 OFFICE O/P EST LOW 20 MIN: CPT | Mod: S$PBB,,, | Performed by: INTERNAL MEDICINE

## 2023-09-14 PROCEDURE — 99999 PR PBB SHADOW E&M-EST. PATIENT-LVL III: ICD-10-PCS | Mod: PBBFAC,,, | Performed by: INTERNAL MEDICINE

## 2023-09-14 PROCEDURE — 3008F BODY MASS INDEX DOCD: CPT | Mod: CPTII,,, | Performed by: INTERNAL MEDICINE

## 2023-09-14 PROCEDURE — 99999 PR PBB SHADOW E&M-EST. PATIENT-LVL III: CPT | Mod: PBBFAC,,, | Performed by: INTERNAL MEDICINE

## 2023-09-14 PROCEDURE — 1160F PR REVIEW ALL MEDS BY PRESCRIBER/CLIN PHARMACIST DOCUMENTED: ICD-10-PCS | Mod: CPTII,,, | Performed by: INTERNAL MEDICINE

## 2023-09-14 PROCEDURE — 3077F PR MOST RECENT SYSTOLIC BLOOD PRESSURE >= 140 MM HG: ICD-10-PCS | Mod: CPTII,,, | Performed by: INTERNAL MEDICINE

## 2023-09-14 PROCEDURE — 3080F DIAST BP >= 90 MM HG: CPT | Mod: CPTII,,, | Performed by: INTERNAL MEDICINE

## 2023-09-14 PROCEDURE — 3077F SYST BP >= 140 MM HG: CPT | Mod: CPTII,,, | Performed by: INTERNAL MEDICINE

## 2023-09-14 RX ORDER — DICYCLOMINE HYDROCHLORIDE 20 MG/1
20 TABLET ORAL EVERY 6 HOURS PRN
Qty: 120 TABLET | Refills: 3 | Status: SHIPPED | OUTPATIENT
Start: 2023-09-14

## 2023-09-14 RX ORDER — DIPHENOXYLATE HYDROCHLORIDE AND ATROPINE SULFATE 2.5; .025 MG/1; MG/1
TABLET ORAL
Qty: 60 TABLET | Refills: 6 | Status: SHIPPED | OUTPATIENT
Start: 2023-09-14

## 2023-09-14 NOTE — PROGRESS NOTES
"[facility]    CC: diarrhea    HPI 28 y.o. female with Graves disease who presents with chronic, daily, liquid diarrhea with intermittent sharp abdominal pain.  She is having about 6 BM's daily.  Imodium did not help.    She has lost over 100lbs.  This is likely due to her poorly controlled thyroid.  She is following with endocrine.    EGD and colonoscopy with biopsy were unrevealing for a cause for her symptoms.    Past Medical History  Past Medical History:   Diagnosis Date    Anemia     Anxiety     AR (allergic rhinitis)     Graves disease     Irregular menses     Obesity     Tobacco abuse        Physical Examination  BP (!) 144/98   Pulse 94   Ht 5' 2" (1.575 m)   Wt 58.2 kg (128 lb 3.2 oz)   BMI 23.45 kg/m²   General appearance: alert, cooperative, no distress  HENT: Normocephalic, atraumatic, neck symmetrical, no nasal discharge   Abdomen: soft, non-tender; non distended, no rebound or guarding  Neurologic: Alert and oriented X 3, moving all four extremities, intact sensation to light touch    Labs:  Lab Results   Component Value Date    WBC 5.3 01/25/2022    HGB 11.3 01/25/2022    HCT 36.9 01/25/2022    MCV 81 01/25/2022     01/25/2022         CMP  Sodium   Date Value Ref Range Status   12/26/2022 140 136 - 145 mmol/L Final   01/25/2022 140 134 - 144 mmol/L Final     Potassium   Date Value Ref Range Status   12/26/2022 3.8 3.5 - 5.1 mmol/L Final   01/25/2022 4.3 3.5 - 5.2 mmol/L Final     Chloride   Date Value Ref Range Status   01/25/2022 105 96 - 106 mmol/L Final     CO2   Date Value Ref Range Status   01/25/2022 22 20 - 29 mmol/L Final     Carbon Dioxide   Date Value Ref Range Status   12/26/2022 26 21 - 32 mmol/L Final     Glucose   Date Value Ref Range Status   01/25/2022 88 65 - 99 mg/dL Final     BUN   Date Value Ref Range Status   12/26/2022 11.3 7.0 - 18.0 mg/dL Final   01/25/2022 7 6 - 20 mg/dL Final     Creatinine   Date Value Ref Range Status   12/26/2022 0.31 (L) 0.55 " - 1.02 mg/dL Final   01/25/2022 0.45 (L) 0.57 - 1.00 mg/dL Final     Calcium   Date Value Ref Range Status   12/26/2022 9.4 8.5 - 10.1 mg/dL Final   01/25/2022 9.7 8.7 - 10.2 mg/dL Final     Total Protein   Date Value Ref Range Status   10/01/2016 7.5 6.0 - 8.5 g/dL Final     Albumin   Date Value Ref Range Status   12/26/2022 3.7 3.4 - 5.0 g/dL Final   10/01/2016 4.6 3.5 - 5.5 g/dL Final     Total Bilirubin   Date Value Ref Range Status   12/26/2022 0.4 0.2 - 1.0 mg/dL Final     Comment:     Use of this assay is not recommended for patients undergoing treatment with eltrombopag due to potential for falsely elevated results.   01/25/2022 0.2 0.0 - 1.2 mg/dL Final     Alkaline Phosphatase   Date Value Ref Range Status   10/01/2016 80 39 - 117 IU/L Final     AST   Date Value Ref Range Status   12/26/2022 22 15 - 37 U/L Final   01/25/2022 20 0 - 40 IU/L Final     ALT   Date Value Ref Range Status   12/26/2022 38 13 - 56 U/L Final   01/25/2022 20 0 - 32 IU/L Final     Anion Gap   Date Value Ref Range Status   12/26/2022 5 5 - 14 Final   05/25/2014 11 8 - 16 mmol/L Final     eGFR   Date Value Ref Range Status   09/09/2022 >105 >89 mL/min Final         Assessment:   1. Diarrhea, unspecified type    2. Abdominal pain, unspecified abdominal location        Plan:  -Start Lomotil 1 or 2 tablets every 6 hours as needed for diarrhea.  -Start bentyl as needed every 6 hours for abdominal pain.    Mirella Freire MD

## 2024-01-03 ENCOUNTER — HOSPITAL ENCOUNTER (EMERGENCY)
Facility: HOSPITAL | Age: 29
Discharge: HOME | End: 2024-01-03
Attending: EMERGENCY MEDICINE
Payer: MEDICAID

## 2024-01-03 VITALS
RESPIRATION RATE: 20 BRPM | TEMPERATURE: 98 F | BODY MASS INDEX: 23 KG/M2 | OXYGEN SATURATION: 99 % | HEART RATE: 90 BPM | WEIGHT: 125 LBS | SYSTOLIC BLOOD PRESSURE: 134 MMHG | DIASTOLIC BLOOD PRESSURE: 85 MMHG | HEIGHT: 62 IN

## 2024-01-03 DIAGNOSIS — R07.81 RIB PAIN ON LEFT SIDE: ICD-10-CM

## 2024-01-03 DIAGNOSIS — R09.89 CHOKING EPISODE: ICD-10-CM

## 2024-01-03 LAB
B-HCG UR QL: NEGATIVE
CTP QC/QA: YES

## 2024-01-03 PROCEDURE — 94640 AIRWAY INHALATION TREATMENT: CPT | Mod: ER

## 2024-01-03 PROCEDURE — 25000242 PHARM REV CODE 250 ALT 637 W/ HCPCS: Mod: ER | Performed by: EMERGENCY MEDICINE

## 2024-01-03 PROCEDURE — 99284 EMERGENCY DEPT VISIT MOD MDM: CPT | Mod: 25,ER

## 2024-01-03 PROCEDURE — 94760 N-INVAS EAR/PLS OXIMETRY 1: CPT | Mod: ER

## 2024-01-03 PROCEDURE — 81025 URINE PREGNANCY TEST: CPT | Mod: ER

## 2024-01-03 RX ORDER — IPRATROPIUM BROMIDE AND ALBUTEROL SULFATE 2.5; .5 MG/3ML; MG/3ML
3 SOLUTION RESPIRATORY (INHALATION)
Status: COMPLETED | OUTPATIENT
Start: 2024-01-03 | End: 2024-01-03

## 2024-01-03 RX ORDER — FLUTICASONE PROPIONATE 44 UG/1
1 AEROSOL, METERED RESPIRATORY (INHALATION) 2 TIMES DAILY
Qty: 10.6 G | Refills: 0 | Status: SHIPPED | OUTPATIENT
Start: 2024-01-03 | End: 2024-01-10

## 2024-01-03 RX ORDER — IBUPROFEN 800 MG/1
800 TABLET ORAL EVERY 6 HOURS PRN
Qty: 20 TABLET | Refills: 0 | Status: SHIPPED | OUTPATIENT
Start: 2024-01-03

## 2024-01-03 RX ADMIN — IPRATROPIUM BROMIDE AND ALBUTEROL SULFATE 3 ML: .5; 3 SOLUTION RESPIRATORY (INHALATION) at 06:01

## 2024-01-04 NOTE — ED PROVIDER NOTES
Encounter Date: 1/3/2024       History     Chief Complaint   Patient presents with    pain to ribs     Pain to left ribs, states choked on coffee and now has pain to left lateral ribs     28-year-old female otherwise healthy presenting with left-sided rib pain after choking on coffee.  Patient notes she has a chronic mild difficulty with swallowing and sometimes chokes due to an enlarged thyroid.  She reports today she choked on coffee and has since had some pain to her left ribs with deep breathing.  Denies shortness of breath.  Denies fevers, chills, nausea, vomiting.  Denies lower extremity edema.  Noted pain occurred directly after swallowing.  No history of DVT or PE.      Review of patient's allergies indicates:   Allergen Reactions    No known drug allergies      Past Medical History:   Diagnosis Date    Anemia     Anxiety     AR (allergic rhinitis)     Graves disease     Irregular menses     Obesity     Tobacco abuse      Past Surgical History:   Procedure Laterality Date    APPENDECTOMY      COLONOSCOPY N/A 5/5/2023    Procedure: COLONOSCOPY;  Surgeon: Mirella Freire MD;  Location: [facility] ENDO;  Service: Endoscopy;  Laterality: N/A;  miralax prep-inst portal-spera only-tb    ESOPHAGOGASTRODUODENOSCOPY N/A 5/5/2023    Procedure: EGD (ESOPHAGOGASTRODUODENOSCOPY);  Surgeon: Mirella Freire MD;  Location: [facility] ENDO;  Service: Endoscopy;  Laterality: N/A;    TONSILLECTOMY       Family History   Problem Relation Age of Onset    Hyperlipidemia Mother     Seizures Mother         Mom had seizures as a child.     Diabetes Father     Heart disease Father     Hypertension Father     Hyperlipidemia Father     Alcohol abuse Maternal Uncle     Drug abuse Maternal Uncle     Arthritis Paternal Aunt     Stroke Paternal Aunt     Early death Paternal Uncle 46        heart attack    Alcohol abuse Maternal Grandmother     Diabetes Maternal Grandmother     Early death Maternal Grandmother 41        heart attack    Seizures  Maternal Grandmother     Diabetes Maternal Grandfather     Diabetes Paternal Grandmother     Colon cancer Neg Hx     Esophageal cancer Neg Hx      Social History     Tobacco Use    Smoking status: Former     Current packs/day: 0.00     Types: Cigarettes     Quit date: 2014     Years since quittin.6    Smokeless tobacco: Never   Substance Use Topics    Alcohol use: Yes     Comment: RARELY    Drug use: No     Review of Systems   Constitutional:  Negative for chills and fever.   HENT:  Negative for sore throat.    Respiratory:  Positive for cough. Negative for chest tightness and shortness of breath.    Cardiovascular:  Positive for chest pain.   Gastrointestinal:  Negative for nausea.   Genitourinary:  Negative for dysuria.   Musculoskeletal:  Negative for back pain.   Skin:  Negative for rash.   Neurological:  Negative for weakness.       Physical Exam     Initial Vitals [24 1748]   BP Pulse Resp Temp SpO2   134/85 90 20 98.1 °F (36.7 °C) 98 %      MAP       --         Physical Exam    Nursing note and vitals reviewed.  Constitutional: She appears well-developed and well-nourished. She is not diaphoretic. No distress.   HENT:   Head: Normocephalic and atraumatic.   Nose: Nose normal.   Eyes: EOM are normal. Pupils are equal, round, and reactive to light. No scleral icterus.   Neck: Neck supple.   Normal range of motion.  Cardiovascular:  Normal rate, regular rhythm, normal heart sounds and intact distal pulses.     Exam reveals no gallop and no friction rub.       No murmur heard.  Pulmonary/Chest: Breath sounds normal. No stridor. No respiratory distress. She has no wheezes. She has no rhonchi. She has no rales.   Abdominal: Abdomen is soft. Bowel sounds are normal. She exhibits no distension. There is no abdominal tenderness. There is no rebound and no guarding.   Musculoskeletal:         General: No tenderness or edema. Normal range of motion.      Cervical back: Normal range of motion and neck  supple.     Neurological: She is oriented to person, place, and time. No cranial nerve deficit.   Skin: Skin is warm and dry. No rash noted.   Psychiatric: She has a normal mood and affect. Her behavior is normal.         ED Course   Procedures  Labs Reviewed   POCT URINE PREGNANCY          Imaging Results              X-Ray Ribs 2 View Left (Final result)  Result time 01/03/24 18:55:32      Final result by Crystal Jimenez MD (01/03/24 18:55:32)                   Impression:      As above described.      Electronically signed by: Crystal Jimenez  Date:    01/03/2024  Time:    18:55               Narrative:    EXAMINATION:  LEFT RIBS    CLINICAL HISTORY:  Left rib pain.    TECHNIQUE:  Two views of the left ribs    COMPARISON:  None    FINDINGS:  Three views of the left ribs demonstrate no displaced fractures.  A tiny cortical irregularity along the left lateral 8th rib on the 2nd image.  An age-indeterminate nondisplaced fracture may be considered.  Recommend correlation with the site of pain.                                       X-Ray Chest PA And Lateral (Final result)  Result time 01/03/24 18:49:36      Final result by Crystal Jimenez MD (01/03/24 18:49:36)                   Impression:      No acute intrathoracic abnormality.      Electronically signed by: Crystal Jimenez  Date:    01/03/2024  Time:    18:49               Narrative:    EXAMINATION:  CHEST PA AND LATERAL    CLINICAL HISTORY:  Other specified symptoms and signs involving the circulatory and respiratory systems    TECHNIQUE:  PA and lateral chest radiograph    COMPARISON:  11/16/2012    FINDINGS:  The cardiac silhouette is within normal limits.  There is no focal consolidation, pneumothorax, or pleural effusion.                                       Medications   albuterol-ipratropium 2.5 mg-0.5 mg/3 mL nebulizer solution 3 mL (3 mLs Nebulization Given 1/3/24 5040)     Medical Decision Making  Risk  Prescription drug management.                           Medical Decision Making:   Initial Assessment:   28-year-old female presenting with left-sided chest pain with breathing after aspiration event.  On exam she is well-appearing and in no acute distress.  Vitals normal.  No tachycardia.  O2 sat normal.  No tachypnea.  Suspect mild aspiration event with coffee.  No evidence of PE, pneumothorax.  Will get chest x-ray, provide breathing treatment, reassess.  Differential Diagnosis:   Aspiration, less likely pneumonia, PE, pneumothorax  ED Management:  Chest x-ray shows no acute findings though possible small rib fracture.  Could be due to choking episode.  Discussed with patient.  Will provide analgesia in the form of ibuprofen.  Believe she is safe for discharge home.  Discussed return precautions.             Clinical Impression:  Final diagnoses:  [R09.89] Choking episode  [R07.81] Rib pain on left side          ED Disposition Condition    Discharge Stable          ED Prescriptions       Medication Sig Dispense Start Date End Date Auth. Provider    ibuprofen (ADVIL,MOTRIN) 800 MG tablet Take 1 tablet (800 mg total) by mouth every 6 (six) hours as needed for Pain. 20 tablet 1/3/2024 -- Khanh Reyes MD          Follow-up Information       Follow up With Specialties Details Why Contact Info    Wilmar Fang MD General Practice Schedule an appointment as soon as possible for a visit   [address] [facility] [address]  [phone]      [facility] ED Emergency Medicine  As needed, If symptoms worsen [address] [facility] [address] [phone]             Khanh Reyes MD  01/03/24 9970

## 2024-01-04 NOTE — DISCHARGE INSTRUCTIONS
You were seen in the emergency department for chest pain.  Your EKG, labs, exam, are all reassuring.  We are not sure what the cause of your chest pain is however we do not believe it is anything immediately dangerous.  It is possible you have a rib injury such as a small cracked rib.  These can be painful but heal on their own.  Take ibuprofen as needed for pain.  Please return if you have any new or worsening pain, difficulty breathing, fevers, coughing up blood or you become concerned in any other way.  Please follow-up with your primary care provider early this week.  Please return for any new or worsening chest pain, nausea, vomiting, difficulty breathing, coughing up blood, profuse sweating, dizziness, lightheadedness, numbness, weakness, or any other new or worsening concerns.